# Patient Record
Sex: MALE | Race: BLACK OR AFRICAN AMERICAN | Employment: FULL TIME | URBAN - NONMETROPOLITAN AREA
[De-identification: names, ages, dates, MRNs, and addresses within clinical notes are randomized per-mention and may not be internally consistent; named-entity substitution may affect disease eponyms.]

---

## 2018-05-22 ENCOUNTER — HOSPITAL ENCOUNTER (EMERGENCY)
Age: 43
Discharge: HOME | End: 2018-05-22

## 2018-05-22 VITALS
HEART RATE: 84 BPM | BODY MASS INDEX: 20.38 KG/M2 | OXYGEN SATURATION: 98 % | TEMPERATURE: 99.9 F | RESPIRATION RATE: 16 BRPM | WEIGHT: 158.8 LBS | DIASTOLIC BLOOD PRESSURE: 87 MMHG | HEIGHT: 74 IN | SYSTOLIC BLOOD PRESSURE: 128 MMHG

## 2018-05-22 DIAGNOSIS — R19.7 DIARRHEA, UNSPECIFIED TYPE: Primary | ICD-10-CM

## 2018-05-22 DIAGNOSIS — R11.0 NAUSEA: ICD-10-CM

## 2018-05-22 PROCEDURE — 99214 OFFICE O/P EST MOD 30 MIN: CPT

## 2018-05-22 PROCEDURE — 99202 OFFICE O/P NEW SF 15 MIN: CPT | Performed by: NURSE PRACTITIONER

## 2018-05-22 ASSESSMENT — ENCOUNTER SYMPTOMS
VOMITING: 0
COUGH: 0
WHEEZING: 0
SHORTNESS OF BREATH: 0
ABDOMINAL PAIN: 0
CONSTIPATION: 0
RHINORRHEA: 0
SINUS PRESSURE: 0
SINUS PAIN: 0
CHEST TIGHTNESS: 0
SORE THROAT: 0
NAUSEA: 0

## 2018-05-22 ASSESSMENT — PAIN SCALES - GENERAL: PAINLEVEL_OUTOF10: 3

## 2018-05-22 ASSESSMENT — PAIN DESCRIPTION - DESCRIPTORS: DESCRIPTORS: ACHING;DISCOMFORT

## 2018-05-22 ASSESSMENT — PAIN DESCRIPTION - PAIN TYPE: TYPE: ACUTE PAIN

## 2018-05-22 ASSESSMENT — PAIN DESCRIPTION - LOCATION: LOCATION: ABDOMEN

## 2018-05-24 ASSESSMENT — ENCOUNTER SYMPTOMS: DIARRHEA: 1

## 2020-01-17 ENCOUNTER — HOSPITAL ENCOUNTER (OUTPATIENT)
Age: 45
Setting detail: OBSERVATION
Discharge: HOME | End: 2020-01-18
Attending: INTERNAL MEDICINE | Admitting: INTERNAL MEDICINE
Payer: COMMERCIAL

## 2020-01-17 ENCOUNTER — APPOINTMENT (OUTPATIENT)
Dept: GENERAL RADIOLOGY | Age: 45
End: 2020-01-17
Payer: COMMERCIAL

## 2020-01-17 PROBLEM — R07.9 CHEST PAIN: Status: ACTIVE | Noted: 2020-01-17

## 2020-01-17 LAB
ALBUMIN SERPL-MCNC: 4.4 G/DL (ref 3.5–5.1)
ALP BLD-CCNC: 60 U/L (ref 38–126)
ALT SERPL-CCNC: 24 U/L (ref 11–66)
AMPHETAMINE+METHAMPHETAMINE URINE SCREEN: NEGATIVE
ANION GAP SERPL CALCULATED.3IONS-SCNC: 15 MEQ/L (ref 8–16)
AST SERPL-CCNC: 34 U/L (ref 5–40)
BARBITURATE QUANTITATIVE URINE: NEGATIVE
BASOPHILS # BLD: 0.2 %
BASOPHILS ABSOLUTE: 0 THOU/MM3 (ref 0–0.1)
BENZODIAZEPINE QUANTITATIVE URINE: NEGATIVE
BILIRUB SERPL-MCNC: 0.4 MG/DL (ref 0.3–1.2)
BUN BLDV-MCNC: 12 MG/DL (ref 7–22)
CALCIUM SERPL-MCNC: 9.4 MG/DL (ref 8.5–10.5)
CANNABINOID QUANTITATIVE URINE: POSITIVE
CHLORIDE BLD-SCNC: 104 MEQ/L (ref 98–111)
CO2: 24 MEQ/L (ref 23–33)
COCAINE METABOLITE QUANTITATIVE URINE: NEGATIVE
CREAT SERPL-MCNC: 0.8 MG/DL (ref 0.4–1.2)
EKG ATRIAL RATE: 79 BPM
EKG P AXIS: 64 DEGREES
EKG P-R INTERVAL: 128 MS
EKG Q-T INTERVAL: 380 MS
EKG QRS DURATION: 88 MS
EKG QTC CALCULATION (BAZETT): 435 MS
EKG R AXIS: 63 DEGREES
EKG T AXIS: 65 DEGREES
EKG VENTRICULAR RATE: 79 BPM
EOSINOPHIL # BLD: 0.4 %
EOSINOPHILS ABSOLUTE: 0 THOU/MM3 (ref 0–0.4)
ERYTHROCYTE [DISTWIDTH] IN BLOOD BY AUTOMATED COUNT: 12.8 % (ref 11.5–14.5)
ERYTHROCYTE [DISTWIDTH] IN BLOOD BY AUTOMATED COUNT: 43.4 FL (ref 35–45)
GFR SERPL CREATININE-BSD FRML MDRD: > 90 ML/MIN/1.73M2
GLUCOSE BLD-MCNC: 95 MG/DL (ref 70–108)
HCT VFR BLD CALC: 41.6 % (ref 42–52)
HEMOGLOBIN: 13.4 GM/DL (ref 14–18)
IMMATURE GRANS (ABS): 0.01 THOU/MM3 (ref 0–0.07)
IMMATURE GRANULOCYTES: 0.2 %
LIPASE: 22.2 U/L (ref 5.6–51.3)
LYMPHOCYTES # BLD: 21.2 %
LYMPHOCYTES ABSOLUTE: 1.1 THOU/MM3 (ref 1–4.8)
MCH RBC QN AUTO: 29.9 PG (ref 26–33)
MCHC RBC AUTO-ENTMCNC: 32.2 GM/DL (ref 32.2–35.5)
MCV RBC AUTO: 92.9 FL (ref 80–94)
MONOCYTES # BLD: 10.5 %
MONOCYTES ABSOLUTE: 0.5 THOU/MM3 (ref 0.4–1.3)
NUCLEATED RED BLOOD CELLS: 0 /100 WBC
OPIATES, URINE: NEGATIVE
OSMOLALITY CALCULATION: 284.5 MOSMOL/KG (ref 275–300)
OXYCODONE: NEGATIVE
PHENCYCLIDINE QUANTITATIVE URINE: NEGATIVE
PLATELET # BLD: 251 THOU/MM3 (ref 130–400)
PMV BLD AUTO: 9.7 FL (ref 9.4–12.4)
POTASSIUM REFLEX MAGNESIUM: 4.7 MEQ/L (ref 3.5–5.2)
RBC # BLD: 4.48 MILL/MM3 (ref 4.7–6.1)
SEG NEUTROPHILS: 67.5 %
SEGMENTED NEUTROPHILS ABSOLUTE COUNT: 3.4 THOU/MM3 (ref 1.8–7.7)
SODIUM BLD-SCNC: 143 MEQ/L (ref 135–145)
TOTAL PROTEIN: 7.3 G/DL (ref 6.1–8)
TROPONIN T: < 0.01 NG/ML
WBC # BLD: 5.1 THOU/MM3 (ref 4.8–10.8)

## 2020-01-17 PROCEDURE — 84484 ASSAY OF TROPONIN QUANT: CPT

## 2020-01-17 PROCEDURE — 93005 ELECTROCARDIOGRAM TRACING: CPT | Performed by: EMERGENCY MEDICINE

## 2020-01-17 PROCEDURE — 36415 COLL VENOUS BLD VENIPUNCTURE: CPT

## 2020-01-17 PROCEDURE — 99220 PR INITIAL OBSERVATION CARE/DAY 70 MINUTES: CPT | Performed by: INTERNAL MEDICINE

## 2020-01-17 PROCEDURE — 71046 X-RAY EXAM CHEST 2 VIEWS: CPT

## 2020-01-17 PROCEDURE — 2580000003 HC RX 258: Performed by: INTERNAL MEDICINE

## 2020-01-17 PROCEDURE — G0378 HOSPITAL OBSERVATION PER HR: HCPCS

## 2020-01-17 PROCEDURE — 6370000000 HC RX 637 (ALT 250 FOR IP): Performed by: EMERGENCY MEDICINE

## 2020-01-17 PROCEDURE — 80307 DRUG TEST PRSMV CHEM ANLYZR: CPT

## 2020-01-17 PROCEDURE — 99285 EMERGENCY DEPT VISIT HI MDM: CPT

## 2020-01-17 PROCEDURE — 96372 THER/PROPH/DIAG INJ SC/IM: CPT

## 2020-01-17 PROCEDURE — 6370000000 HC RX 637 (ALT 250 FOR IP): Performed by: INTERNAL MEDICINE

## 2020-01-17 PROCEDURE — 94760 N-INVAS EAR/PLS OXIMETRY 1: CPT

## 2020-01-17 PROCEDURE — 93010 ELECTROCARDIOGRAM REPORT: CPT | Performed by: NUCLEAR MEDICINE

## 2020-01-17 PROCEDURE — 2709999900 HC NON-CHARGEABLE SUPPLY

## 2020-01-17 PROCEDURE — 85025 COMPLETE CBC W/AUTO DIFF WBC: CPT

## 2020-01-17 PROCEDURE — 83690 ASSAY OF LIPASE: CPT

## 2020-01-17 PROCEDURE — 6360000002 HC RX W HCPCS: Performed by: INTERNAL MEDICINE

## 2020-01-17 PROCEDURE — 80053 COMPREHEN METABOLIC PANEL: CPT

## 2020-01-17 RX ORDER — ONDANSETRON 2 MG/ML
4 INJECTION INTRAMUSCULAR; INTRAVENOUS EVERY 6 HOURS PRN
Status: DISCONTINUED | OUTPATIENT
Start: 2020-01-17 | End: 2020-01-18 | Stop reason: HOSPADM

## 2020-01-17 RX ORDER — FAMOTIDINE 20 MG/1
20 TABLET, FILM COATED ORAL 2 TIMES DAILY
Status: DISCONTINUED | OUTPATIENT
Start: 2020-01-17 | End: 2020-01-18 | Stop reason: HOSPADM

## 2020-01-17 RX ORDER — POTASSIUM CHLORIDE 20 MEQ/1
40 TABLET, EXTENDED RELEASE ORAL PRN
Status: DISCONTINUED | OUTPATIENT
Start: 2020-01-17 | End: 2020-01-18 | Stop reason: HOSPADM

## 2020-01-17 RX ORDER — ACETAMINOPHEN 325 MG/1
650 TABLET ORAL EVERY 4 HOURS PRN
Status: DISCONTINUED | OUTPATIENT
Start: 2020-01-17 | End: 2020-01-18 | Stop reason: HOSPADM

## 2020-01-17 RX ORDER — POTASSIUM CHLORIDE 7.45 MG/ML
10 INJECTION INTRAVENOUS PRN
Status: DISCONTINUED | OUTPATIENT
Start: 2020-01-17 | End: 2020-01-18 | Stop reason: HOSPADM

## 2020-01-17 RX ORDER — SODIUM CHLORIDE 0.9 % (FLUSH) 0.9 %
10 SYRINGE (ML) INJECTION PRN
Status: DISCONTINUED | OUTPATIENT
Start: 2020-01-17 | End: 2020-01-18 | Stop reason: HOSPADM

## 2020-01-17 RX ORDER — ASPIRIN 81 MG/1
324 TABLET, CHEWABLE ORAL ONCE
Status: COMPLETED | OUTPATIENT
Start: 2020-01-17 | End: 2020-01-17

## 2020-01-17 RX ORDER — NITROGLYCERIN 0.4 MG/1
0.4 TABLET SUBLINGUAL EVERY 5 MIN PRN
Status: DISCONTINUED | OUTPATIENT
Start: 2020-01-17 | End: 2020-01-18 | Stop reason: HOSPADM

## 2020-01-17 RX ORDER — SODIUM CHLORIDE 0.9 % (FLUSH) 0.9 %
10 SYRINGE (ML) INJECTION EVERY 12 HOURS SCHEDULED
Status: DISCONTINUED | OUTPATIENT
Start: 2020-01-17 | End: 2020-01-18 | Stop reason: HOSPADM

## 2020-01-17 RX ORDER — ASPIRIN 81 MG/1
81 TABLET, CHEWABLE ORAL DAILY
Status: DISCONTINUED | OUTPATIENT
Start: 2020-01-18 | End: 2020-01-18 | Stop reason: HOSPADM

## 2020-01-17 RX ADMIN — ASPIRIN 81 MG 324 MG: 81 TABLET ORAL at 10:34

## 2020-01-17 RX ADMIN — Medication 10 ML: at 21:00

## 2020-01-17 RX ADMIN — NITROGLYCERIN 1 INCH: 20 OINTMENT TOPICAL at 12:05

## 2020-01-17 RX ADMIN — ENOXAPARIN SODIUM 40 MG: 40 INJECTION SUBCUTANEOUS at 21:00

## 2020-01-17 RX ADMIN — FAMOTIDINE 20 MG: 20 TABLET ORAL at 21:00

## 2020-01-17 ASSESSMENT — PAIN DESCRIPTION - PAIN TYPE
TYPE: ACUTE PAIN

## 2020-01-17 ASSESSMENT — PAIN DESCRIPTION - LOCATION
LOCATION: CHEST

## 2020-01-17 ASSESSMENT — PAIN - FUNCTIONAL ASSESSMENT
PAIN_FUNCTIONAL_ASSESSMENT: ACTIVITIES ARE NOT PREVENTED
PAIN_FUNCTIONAL_ASSESSMENT: ACTIVITIES ARE NOT PREVENTED

## 2020-01-17 ASSESSMENT — PAIN SCALES - GENERAL
PAINLEVEL_OUTOF10: 2
PAINLEVEL_OUTOF10: 5

## 2020-01-17 ASSESSMENT — PAIN DESCRIPTION - DESCRIPTORS
DESCRIPTORS: ACHING
DESCRIPTORS: PRESSURE
DESCRIPTORS: SHARP

## 2020-01-17 ASSESSMENT — PAIN DESCRIPTION - PROGRESSION
CLINICAL_PROGRESSION: GRADUALLY IMPROVING
CLINICAL_PROGRESSION: NOT CHANGED

## 2020-01-17 ASSESSMENT — HEART SCORE: ECG: 0

## 2020-01-17 ASSESSMENT — ENCOUNTER SYMPTOMS
ABDOMINAL PAIN: 0
SHORTNESS OF BREATH: 0
COLOR CHANGE: 0
BACK PAIN: 0
CHEST TIGHTNESS: 0
NAUSEA: 0
ABDOMINAL DISTENTION: 0
COUGH: 0

## 2020-01-17 ASSESSMENT — PAIN DESCRIPTION - ORIENTATION
ORIENTATION: LEFT

## 2020-01-17 ASSESSMENT — PAIN DESCRIPTION - DIRECTION: RADIATING_TOWARDS: LEFT ARM

## 2020-01-17 ASSESSMENT — PAIN DESCRIPTION - ONSET
ONSET: ON-GOING
ONSET: ON-GOING

## 2020-01-17 ASSESSMENT — PAIN DESCRIPTION - FREQUENCY
FREQUENCY: INTERMITTENT
FREQUENCY: CONTINUOUS

## 2020-01-17 NOTE — H&P
01/17/20  0936   AST 34   ALT 24   BILITOT 0.4   ALKPHOS 60       No results for input(s): INR in the last 72 hours.  No results for input(s): Anurag Hippo in the last 72 hours. Radiology reports- Xr Chest Standard (2 Vw)    Result Date: 1/17/2020  PROCEDURE: XR CHEST (2 VW) CLINICAL INFORMATION: chest pain COMPARISON: 9/27/2013 TECHNIQUE: PA and lateral views of the chest were obtained. Normal chest. No acute findings. **This report has been created using voice recognition software. It may contain minor errors which are inherent in voice recognition technology. ** Final report electronically signed by Dr. Winifred Donovan on 1/17/2020 9:54 AM      Electronically signed by Susan Butler DO on 1/17/2020 at 12:13 PM

## 2020-01-17 NOTE — LETTER
[address] [facility] [address]  Phone: [phone]             January 18, 2020    Patient: Giovanny Hi   YOB: 1975   Date of Visit: 1/17/2020       To Whom It May Concern:    Wesley Jean-Baptiste was seen and treated in our facility  beginning 1/17/2020 until 1/18/2020 . He may return to work on 01/21/2020.       Sincerely,       Varun Pleitez RN         Signature:__________________________________

## 2020-01-17 NOTE — ED NOTES
ED to inpatient nurses report    Chief Complaint   Patient presents with    Chest Pain      Present to ED from home  LOC: alert and orientated to name, place, date  Vital signs   Vitals:    01/17/20 0923 01/17/20 0928 01/17/20 1036 01/17/20 1206   BP:  120/86  (!) 134/93   Pulse: 87  66 66   Resp: 15   17   SpO2: 100%  100% 98%      Oxygen Baseline on room air     Current needs required: none    LDAs:  LFA 20G  Mobility: Independent  Pending ED orders: none   Present condition: stable     Electronically signed by Lloyd Pierre RN on 1/17/2020 at 12:24 PM     Lloyd Pierre RN  01/17/20 4153

## 2020-01-17 NOTE — ED NOTES
Patient states pain is improved on its own without intervention.  Patient given nitro paste per orders     Shonna Michael, SHANTA  01/17/20 3766

## 2020-01-17 NOTE — ED PROVIDER NOTES
[facility]  eMERGENCY dEPARTMENT eNCOUnter          CHIEF COMPLAINT       Chief Complaint   Patient presents with    Chest Pain       Nurses Notes reviewed and I agree except as noted in the HPI. HISTORY OF PRESENT ILLNESS    Marina Balderas is a 39 y.o. male who presents to the Emergency Department for the evaluation of left-sided chest pain. Patient states the pain began this morning while at work. Approximately 7 AM.  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 Patient thought at first that it hurt worse with movement, but after he relaxed for period time he maintained the left-sided chest pain. Patient is here for evaluation of the left-sided chest pain. Patient denies any known health issues. He is a smoker. Denies hard drug use. Patient states the pain remains in the left upper chest.  Does not radiate to left neck, jaw, upper extremity. Patient rates his pain a 6 to a 7 on a 10 scale at this time. Patient has not taken anything for his pain. He denies fever, cough, body aches or chills. No sick-like symptoms. Patient states he felt fine when he awoke this morning. Patient states he has a family history of cancer but no known heart issues. The HPI was provided by the patient. REVIEW OF SYSTEMS     Review of Systems   Constitutional: Negative for chills, diaphoresis, fatigue and fever. Respiratory: Negative for cough, chest tightness and shortness of breath. Cardiovascular: Positive for chest pain. Negative for palpitations and leg swelling. Gastrointestinal: Negative for abdominal distention, abdominal pain and nausea. Musculoskeletal: Negative for back pain and neck pain. Skin: Negative for color change. Neurological: Negative for dizziness, weakness, light-headedness and numbness. Psychiatric/Behavioral: Negative for behavioral problems.        PAST MEDICAL HISTORY Mental Status: He is alert. Psychiatric:         Mood and Affect: Mood normal.          DIFFERENTIAL DIAGNOSIS:   ACS, unstable angina, pleurisy, pericarditis, endocarditis    DIAGNOSTIC RESULTS     EKG: All EKG's are interpreted by the Emergency Department Physician who either signs or Co-signs this chart in the absence of a cardiologist.    Normal sinus rhythm ventricular rate 79 bpm.  PA interval 128, QRS duration 88, corrected  ms. RADIOLOGY: non-plainfilm images(s) such as CT, Ultrasound and MRI are read by the radiologist.    XR CHEST STANDARD (2 VW)   Final Result   Normal chest. No acute findings. **This report has been created using voice recognition software. It may contain minor errors which are inherent in voice recognition technology. **      Final report electronically signed by Dr. Erin Huston on 1/17/2020 9:54 AM          LABS:     Labs Reviewed   CBC WITH AUTO DIFFERENTIAL - Abnormal; Notable for the following components:       Result Value    RBC 4.48 (*)     Hemoglobin 13.4 (*)     Hematocrit 41.6 (*)     All other components within normal limits   COMPREHENSIVE METABOLIC PANEL W/ REFLEX TO MG FOR LOW K   LIPASE   TROPONIN   ANION GAP   OSMOLALITY   GLOMERULAR FILTRATION RATE, ESTIMATED   URINE DRUG SCREEN   TROPONIN   TROPONIN       EMERGENCY DEPARTMENT COURSE:   Vitals:    Vitals:    01/17/20 1206 01/17/20 1315 01/17/20 1637 01/17/20 1713   BP: (!) 134/93 135/83 119/82    Pulse: 66 68 63    Resp: 17 16 15 16   Temp:  98 °F (36.7 °C) 98 °F (36.7 °C)    TempSrc:  Oral Oral    SpO2: 98% 100% 100% 99%   Weight:  149 lb 4.8 oz (67.7 kg)     Height:  6' 2\" (1.88 m)         11:28 AM: The patient was seen and evaluated. The patient currently has chest pain upon initial assessment. Appropriate labs and imaging are ordered. Patient is given 324 mg aspirin. Will order trial of nitroglycerin sublingual as patient continues to have active chest pain.     Patient EKG has no acute ST elevation or depression concerning for ischemia or infarction. The patient, however, had relief with nitroglycerin. He states his pain went from a 6 to a 7 down to completely gone. Heart Score for chest pain patients  History: Moderately Suspicious  ECG: Normal  Patient Age: < 45 years  *Risk factors for Atherosclerotic disease: Cigarette smoking  Risk Factors: 1 or 2 risk factors  Troponin: < 1X normal limit  Heart Score Total: 2      MDM:  Patient had complete relief of his chest pain with nitroglycerin. His heart score is 2, troponin is negative, EKG has no changes. However, due to the relief of pain with his nitro, I contacted Dr. Mendoza Gaona of the hospitalist service who graciously agreed to admit the patient for observation admission. CRITICAL CARE:   None    CONSULTS:  None    PROCEDURES:  None    FINAL IMPRESSION    Chest Pain    DISPOSITION/PLAN   Admit    PATIENT REFERRED TO:  NORMA Gustafson CNP  [address]             DISCHARGE MEDICATIONS:  Current Discharge Medication List          (Please note that portions of this note were completed with a voice recognition program.  Efforts were made to edit the dictations but occasionally words are mis-transcribed.)    The patient was given an opportunity to see the Emergency Attending. The patient voiced understanding that I was a Mid-LevelProvider and was in agreement with being seen independently by myself.           NORMA Pyle CNP  01/17/20 2891

## 2020-01-18 VITALS
SYSTOLIC BLOOD PRESSURE: 125 MMHG | BODY MASS INDEX: 19.16 KG/M2 | OXYGEN SATURATION: 97 % | RESPIRATION RATE: 16 BRPM | HEIGHT: 74 IN | DIASTOLIC BLOOD PRESSURE: 74 MMHG | WEIGHT: 149.3 LBS | HEART RATE: 68 BPM | TEMPERATURE: 97.6 F

## 2020-01-18 LAB
ANION GAP SERPL CALCULATED.3IONS-SCNC: 10 MEQ/L (ref 8–16)
AVERAGE GLUCOSE: 99 MG/DL (ref 70–126)
BUN BLDV-MCNC: 11 MG/DL (ref 7–22)
CALCIUM SERPL-MCNC: 8.8 MG/DL (ref 8.5–10.5)
CHLORIDE BLD-SCNC: 102 MEQ/L (ref 98–111)
CHOLESTEROL, TOTAL: 172 MG/DL (ref 100–199)
CO2: 25 MEQ/L (ref 23–33)
CREAT SERPL-MCNC: 0.9 MG/DL (ref 0.4–1.2)
EKG ATRIAL RATE: 67 BPM
EKG P AXIS: 62 DEGREES
EKG P-R INTERVAL: 144 MS
EKG Q-T INTERVAL: 398 MS
EKG QRS DURATION: 90 MS
EKG QTC CALCULATION (BAZETT): 420 MS
EKG R AXIS: 56 DEGREES
EKG T AXIS: 64 DEGREES
EKG VENTRICULAR RATE: 67 BPM
ERYTHROCYTE [DISTWIDTH] IN BLOOD BY AUTOMATED COUNT: 12.7 % (ref 11.5–14.5)
ERYTHROCYTE [DISTWIDTH] IN BLOOD BY AUTOMATED COUNT: 43.3 FL (ref 35–45)
GFR SERPL CREATININE-BSD FRML MDRD: > 90 ML/MIN/1.73M2
GLUCOSE BLD-MCNC: 88 MG/DL (ref 70–108)
HBA1C MFR BLD: 5.3 % (ref 4.4–6.4)
HCT VFR BLD CALC: 41.9 % (ref 42–52)
HDLC SERPL-MCNC: 84 MG/DL
HEMOGLOBIN: 13.3 GM/DL (ref 14–18)
LDL CHOLESTEROL CALCULATED: 65 MG/DL
MAGNESIUM: 1.8 MG/DL (ref 1.6–2.4)
MCH RBC QN AUTO: 29.4 PG (ref 26–33)
MCHC RBC AUTO-ENTMCNC: 31.7 GM/DL (ref 32.2–35.5)
MCV RBC AUTO: 92.5 FL (ref 80–94)
PLATELET # BLD: 232 THOU/MM3 (ref 130–400)
PMV BLD AUTO: 9.8 FL (ref 9.4–12.4)
POTASSIUM REFLEX MAGNESIUM: 4.3 MEQ/L (ref 3.5–5.2)
RBC # BLD: 4.53 MILL/MM3 (ref 4.7–6.1)
SODIUM BLD-SCNC: 137 MEQ/L (ref 135–145)
TRIGL SERPL-MCNC: 117 MG/DL (ref 0–199)
WBC # BLD: 4.5 THOU/MM3 (ref 4.8–10.8)

## 2020-01-18 PROCEDURE — 3430000000 HC RX DIAGNOSTIC RADIOPHARMACEUTICAL: Performed by: INTERNAL MEDICINE

## 2020-01-18 PROCEDURE — 93005 ELECTROCARDIOGRAM TRACING: CPT | Performed by: INTERNAL MEDICINE

## 2020-01-18 PROCEDURE — 99244 OFF/OP CNSLTJ NEW/EST MOD 40: CPT | Performed by: INTERNAL MEDICINE

## 2020-01-18 PROCEDURE — G0378 HOSPITAL OBSERVATION PER HR: HCPCS

## 2020-01-18 PROCEDURE — 94760 N-INVAS EAR/PLS OXIMETRY 1: CPT

## 2020-01-18 PROCEDURE — 83735 ASSAY OF MAGNESIUM: CPT

## 2020-01-18 PROCEDURE — 6370000000 HC RX 637 (ALT 250 FOR IP): Performed by: INTERNAL MEDICINE

## 2020-01-18 PROCEDURE — 80061 LIPID PANEL: CPT

## 2020-01-18 PROCEDURE — 85027 COMPLETE CBC AUTOMATED: CPT

## 2020-01-18 PROCEDURE — 83036 HEMOGLOBIN GLYCOSYLATED A1C: CPT

## 2020-01-18 PROCEDURE — 6360000002 HC RX W HCPCS: Performed by: INTERNAL MEDICINE

## 2020-01-18 PROCEDURE — 93017 CV STRESS TEST TRACING ONLY: CPT

## 2020-01-18 PROCEDURE — 78452 HT MUSCLE IMAGE SPECT MULT: CPT

## 2020-01-18 PROCEDURE — 80048 BASIC METABOLIC PNL TOTAL CA: CPT

## 2020-01-18 PROCEDURE — A9500 TC99M SESTAMIBI: HCPCS | Performed by: INTERNAL MEDICINE

## 2020-01-18 PROCEDURE — 2709999900 HC NON-CHARGEABLE SUPPLY

## 2020-01-18 PROCEDURE — 2580000003 HC RX 258: Performed by: INTERNAL MEDICINE

## 2020-01-18 PROCEDURE — 93306 TTE W/DOPPLER COMPLETE: CPT

## 2020-01-18 PROCEDURE — 93010 ELECTROCARDIOGRAM REPORT: CPT | Performed by: NUCLEAR MEDICINE

## 2020-01-18 PROCEDURE — 99217 PR OBSERVATION CARE DISCHARGE MANAGEMENT: CPT | Performed by: INTERNAL MEDICINE

## 2020-01-18 PROCEDURE — 96374 THER/PROPH/DIAG INJ IV PUSH: CPT

## 2020-01-18 PROCEDURE — 36415 COLL VENOUS BLD VENIPUNCTURE: CPT

## 2020-01-18 RX ORDER — KETOROLAC TROMETHAMINE 30 MG/ML
30 INJECTION, SOLUTION INTRAMUSCULAR; INTRAVENOUS ONCE
Status: COMPLETED | OUTPATIENT
Start: 2020-01-18 | End: 2020-01-18

## 2020-01-18 RX ORDER — METOPROLOL TARTRATE 5 MG/5ML
5 INJECTION INTRAVENOUS EVERY 5 MIN PRN
Status: DISCONTINUED | OUTPATIENT
Start: 2020-01-18 | End: 2020-01-18 | Stop reason: HOSPADM

## 2020-01-18 RX ORDER — METOPROLOL TARTRATE 5 MG/5ML
5 INJECTION INTRAVENOUS EVERY 5 MIN PRN
Status: DISCONTINUED | OUTPATIENT
Start: 2020-01-18 | End: 2020-01-18 | Stop reason: SDUPTHER

## 2020-01-18 RX ORDER — SODIUM CHLORIDE 9 MG/ML
500 INJECTION, SOLUTION INTRAVENOUS CONTINUOUS PRN
Status: DISCONTINUED | OUTPATIENT
Start: 2020-01-18 | End: 2020-01-18 | Stop reason: HOSPADM

## 2020-01-18 RX ORDER — SODIUM CHLORIDE 0.9 % (FLUSH) 0.9 %
10 SYRINGE (ML) INJECTION PRN
Status: DISCONTINUED | OUTPATIENT
Start: 2020-01-18 | End: 2020-01-18 | Stop reason: SDUPTHER

## 2020-01-18 RX ORDER — ALBUTEROL SULFATE 90 UG/1
2 AEROSOL, METERED RESPIRATORY (INHALATION) PRN
Status: DISCONTINUED | OUTPATIENT
Start: 2020-01-18 | End: 2020-01-18 | Stop reason: HOSPADM

## 2020-01-18 RX ORDER — SODIUM CHLORIDE 9 MG/ML
500 INJECTION, SOLUTION INTRAVENOUS CONTINUOUS PRN
Status: DISCONTINUED | OUTPATIENT
Start: 2020-01-18 | End: 2020-01-18 | Stop reason: SDUPTHER

## 2020-01-18 RX ORDER — ATROPINE SULFATE 0.1 MG/ML
0.5 INJECTION INTRAVENOUS EVERY 5 MIN PRN
Status: DISCONTINUED | OUTPATIENT
Start: 2020-01-18 | End: 2020-01-18 | Stop reason: HOSPADM

## 2020-01-18 RX ORDER — NITROGLYCERIN 0.4 MG/1
0.4 TABLET SUBLINGUAL EVERY 5 MIN PRN
Status: DISCONTINUED | OUTPATIENT
Start: 2020-01-18 | End: 2020-01-18 | Stop reason: SDUPTHER

## 2020-01-18 RX ORDER — ATROPINE SULFATE 0.1 MG/ML
0.5 INJECTION INTRAVENOUS EVERY 5 MIN PRN
Status: DISCONTINUED | OUTPATIENT
Start: 2020-01-18 | End: 2020-01-18 | Stop reason: SDUPTHER

## 2020-01-18 RX ORDER — ALBUTEROL SULFATE 90 UG/1
2 AEROSOL, METERED RESPIRATORY (INHALATION) PRN
Status: DISCONTINUED | OUTPATIENT
Start: 2020-01-18 | End: 2020-01-18 | Stop reason: SDUPTHER

## 2020-01-18 RX ORDER — SODIUM CHLORIDE 0.9 % (FLUSH) 0.9 %
10 SYRINGE (ML) INJECTION PRN
Status: DISCONTINUED | OUTPATIENT
Start: 2020-01-18 | End: 2020-01-18 | Stop reason: HOSPADM

## 2020-01-18 RX ADMIN — FAMOTIDINE 20 MG: 20 TABLET ORAL at 08:49

## 2020-01-18 RX ADMIN — Medication 35 MILLICURIE: at 11:45

## 2020-01-18 RX ADMIN — Medication 10 ML: at 10:15

## 2020-01-18 RX ADMIN — Medication 8.3 MILLICURIE: at 10:36

## 2020-01-18 RX ADMIN — ASPIRIN 81 MG 81 MG: 81 TABLET ORAL at 08:49

## 2020-01-18 RX ADMIN — KETOROLAC TROMETHAMINE 30 MG: 30 INJECTION, SOLUTION INTRAMUSCULAR at 10:15

## 2020-01-18 ASSESSMENT — PAIN SCALES - GENERAL
PAINLEVEL_OUTOF10: 0
PAINLEVEL_OUTOF10: 1

## 2020-01-18 NOTE — FLOWSHEET NOTE
01/17/20 1845   Encounter Summary   Services provided to: Patient   Referral/Consult From: Nurse   Support System Family members   Continue Visiting Yes  (1/17)   Complexity of Encounter Moderate   Length of Encounter 45 minutes   Advance Care Planning Yes   Advance Directives (For Healthcare)   Healthcare Directive No, patient does not have an advance directive for healthcare treatment   Information on Healthcare Directives Requested Yes   Patient Requests Assistance Other (Comment)   Advance Directive Consult: Advance Directive education provided. Patient had no prior knowledge. Wants to talk to the person he is choosing to be sure it is OK with urmila.  Will contact when ready to complete.

## 2020-01-18 NOTE — PLAN OF CARE
Problem: Pain:  Goal: Pain level will decrease  Description  Pain level will decrease  Outcome: Ongoing  Note:   Patient voiced pain this shift at 2/10. Patient's pain goal is 0/10. RN continues to deliver PRN medication and attempts noninvasive methods such as repositioning, ice packs, massage. Pain is re-assessed frequently. Bed alarm set after pain meds given. Problem: Pain:  Goal: Control of acute pain  Description  Control of acute pain  Outcome: Ongoing  Note:   Patient voiced pain this shift at 2/10. Patient's pain goal is 0/10. RN continues to deliver PRN medication and attempts noninvasive methods such as repositioning, ice packs, massage. Pain is re-assessed frequently. Bed alarm set after pain meds given. Problem: Pain:  Goal: Control of chronic pain  Description  Control of chronic pain  Outcome: Ongoing  Note:   Patient voiced pain this shift at 2/10. Patient's pain goal is 0/10. RN continues to deliver PRN medication and attempts noninvasive methods such as repositioning, ice packs, massage. Pain is re-assessed frequently. Bed alarm set after pain meds given. Problem: Tissue Perfusion - Cardiopulmonary, Altered:  Goal: Absence of angina  Description  Absence of angina  Outcome: Ongoing  Note:   Troponin levels and EKG monitors being monitored. Pulse oximetry being monitored every four hours. Problem: Discharge Planning:  Goal: Discharged to appropriate level of care  Description  Discharged to appropriate level of care  Outcome: Ongoing  Note:   Patient plans to return home at discharge. Patient will not need assistance at home. Problem: Cardiac Output - Decreased:  Goal: Hemodynamic stability will improve  Description  Hemodynamic stability will improve  Outcome: Ongoing  Note:   Blood pressure WNL. Vitals monitored and recorded. Patient hemodynamically stable. Cardiac monitor in place with strips being read every four hours. Care plan reviewed with patient. Patient verbalizes understanding of the plan of care and contribute to goal setting.

## 2020-01-18 NOTE — DISCHARGE SUMMARY
muscles intact. Conjunctivae/corneas clear. Neck: Supple, with full range of motion. No jugular venous distention. Trachea midline. Respiratory:  Normal respiratory effort. Clear to auscultation, bilaterally without Rales/Wheezes/Rhonchi. Cardiovascular:  Regular rate and rhythm with normal S1/S2 without murmurs, rubs or gallops. Abdomen: Soft, non-tender, non-distended with normal bowel sounds. Musculoskeletal:  No clubbing, cyanosis or edema bilaterally. Full range of motion without deformity. Skin: Skin color, texture, turgor normal.  No rashes or lesions. Neurologic:  Neurovascularly intact without any focal sensory/motor deficits. Cranial nerves: II-XII intact, grossly non-focal.  Psychiatric:  Alert and oriented, thought content appropriate, normal insight  Capillary Refill: Brisk,< 3 seconds   Peripheral Pulses: +2 palpable, equal bilaterally       Labs: For convenience and continuity at follow-up the following most recent labs are provided:      CBC:    Lab Results   Component Value Date    WBC 4.5 01/18/2020    HGB 13.3 01/18/2020    HCT 41.9 01/18/2020     01/18/2020       Renal:    Lab Results   Component Value Date     01/18/2020    K 4.3 01/18/2020     01/18/2020    CO2 25 01/18/2020    BUN 11 01/18/2020    CREATININE 0.9 01/18/2020    CALCIUM 8.8 01/18/2020         Significant Diagnostic Studies    Radiology:   NM Cardiac Stress Test Nuclear Imaging- Treadmill   Final Result      XR CHEST STANDARD (2 VW)   Final Result   Normal chest. No acute findings. **This report has been created using voice recognition software. It may contain minor errors which are inherent in voice recognition technology. **      Final report electronically signed by Dr. Claude Montejo on 1/17/2020 9:54 AM      NM Cardiac Stress Test Nuclear Imaging- Treadmill    (Results Pending)          Consults:     IP CONSULT TO CARDIOLOGY  IP CONSULT TO SPIRITUAL SERVICES    Disposition:    [x] Home

## 2020-01-18 NOTE — PLAN OF CARE
Problem: Pain:  Goal: Pain level will decrease  Description  Pain level will decrease  1/18/2020 1153 by Varun Pleitez RN  Outcome: Ongoing  Note:   Patient complains of pain in the chest a 1/10 with a pain goal of 0/10. Patient repositions self frequently and is resting in bed. One time dose of toradol given as ordered for pain. Will conitnue tp assess patients pain with hourly rounding. Problem: Pain:  Goal: Control of acute pain  Description  Control of acute pain  1/18/2020 1153 by Varun Pleitez RN  Outcome: Ongoing  Note:   Patient states that pain interventions help to ease the pain. Problem: Pain:  Goal: Control of chronic pain  Description  Control of chronic pain  1/18/2020 1153 by Varun Pleitez RN  Outcome: Ongoing  Note:   No complaints of chronic pain at this time. Problem: Tissue Perfusion - Cardiopulmonary, Altered:  Goal: Absence of angina  Description  Absence of angina  1/18/2020 1153 by Varun Pleitez RN  Outcome: Ongoing  Note:   Patient complains of chest pain a 1/10. One time dose of toradol given as ordered. Patient to have a stress test.      Problem: Discharge Planning:  Goal: Discharged to appropriate level of care  Description  Discharged to appropriate level of care  1/18/2020 1153 by Varun Pleitez RN  Outcome: Ongoing  Note:   Patient to discharge home. Problem: Cardiac Output - Decreased:  Goal: Hemodynamic stability will improve  Description  Hemodynamic stability will improve  1/18/2020 1153 by Varun Pleitez RN  Outcome: Ongoing  Note:   Patients vitals stable this shift. No signs or symptoms of distress noted at this time. Care plan reviewed with patient. Patient verbalizes understanding of the plan of care and contributes to goal setting.

## 2020-01-18 NOTE — CONSULTS
Not on file   Tobacco Use    Smoking status: Current Every Day Smoker     Packs/day: 0.50     Types: Cigarettes    Smokeless tobacco: Never Used   Substance and Sexual Activity    Alcohol use: Yes     Comment: 3/4 240z per day    Drug use: Yes     Types: Marijuana    Sexual activity: Not on file   Lifestyle    Physical activity:     Days per week: Not on file     Minutes per session: Not on file    Stress: Not on file   Relationships    Social connections:     Talks on phone: Not on file     Gets together: Not on file     Attends Religion service: Not on file     Active member of club or organization: Not on file     Attends meetings of clubs or organizations: Not on file     Relationship status: Not on file    Intimate partner violence:     Fear of current or ex partner: Not on file     Emotionally abused: Not on file     Physically abused: Not on file     Forced sexual activity: Not on file   Other Topics Concern    Not on file   Social History Narrative    Not on file       Current Facility-Administered Medications   Medication Dose Route Frequency Provider Last Rate Last Dose    ketorolac (TORADOL) injection 30 mg  30 mg Intravenous Once Aldair Hairston MD        nitroGLYCERIN (NITROSTAT) SL tablet 0.4 mg  0.4 mg Sublingual Q5 Min PRN Nicholas Ríos APRN - CNP        sodium chloride flush 0.9 % injection 10 mL  10 mL Intravenous 2 times per day [facility], DO   10 mL at 01/17/20 2100    sodium chloride flush 0.9 % injection 10 mL  10 mL Intravenous PRN [facility], DO        magnesium hydroxide (MILK OF MAGNESIA) 400 MG/5ML suspension 30 mL  30 mL Oral Daily PRN [facility], DO        ondansetron (ZOFRAN) injection 4 mg  4 mg Intravenous Q6H PRN [facility], DO        aspirin chewable tablet 81 mg  81 mg Oral Daily Mumtaz Arias DO   81 mg at 01/18/20 0849    magnesium sulfate 1 g in dextrose 5 % 100 mL IVPB  1 g Intravenous PRN [facility], DO        potassium chloride Yany Bel M) extended release tablet 40 mEq  40 mEq Oral PRN Sherren Matt, DO        Or    potassium bicarb-citric acid (EFFER-K) effervescent tablet 40 mEq  40 mEq Oral PRN Sherren Nice, DO        Or    potassium chloride 10 mEq/100 mL IVPB (Peripheral Line)  10 mEq Intravenous PRN Sherren Nice, DO        famotidine (PEPCID) tablet 20 mg  20 mg Oral BID Sherren Matt, DO   20 mg at 01/18/20 0849    acetaminophen (TYLENOL) tablet 650 mg  650 mg Oral Q4H PRN Antonyren Nice, DO        enoxaparin (LOVENOX) injection 40 mg  40 mg Subcutaneous Daily Sherren Nice, DO   40 mg at 01/17/20 2100    influenza quadrivalent split vaccine (FLUZONE;FLUARIX;FLULAVAL;AFLURIA) injection 0.5 mL  0.5 mL Intramuscular Once Sherren Matt, DO           Review of Systems -     General ROS: negative  Psychological ROS: negative  Hematological and Lymphatic ROS: No history of blood clots or bleeding disorder. Respiratory ROS: no cough,  or wheezing, the rest see HPI  Cardiovascular ROS: See HPI  Gastrointestinal ROS: negative  Genito-Urinary ROS: no dysuria, trouble voiding, or hematuria  Musculoskeletal ROS: negative  Neurological ROS: no TIA or stroke symptoms  Dermatological ROS: negative      Blood pressure 125/74, pulse 68, temperature 97.6 °F (36.4 °C), temperature source Oral, resp. rate 16, height 6' 2\" (1.88 m), weight 149 lb 4.8 oz (67.7 kg), SpO2 99 %.         Physical Examination:    General appearance - alert, well appearing, and in no distress  HEENT- Pink conjunctiva  , Non-icteri sclera,PERRLA  Mental status - alert, oriented to person, place, and time  Neck - supple, no significant adenopathy, no JVD, or carotid bruits  Chest - clear to auscultation, no wheezes, rales or rhonchi, symmetric air entry  Heart - normal rate, regular rhythm, normal S1, S2, no murmurs, rubs, clicks or gallops  Abdomen - soft, nontender, nondistended, no masses or organomegaly  CONCHITA- no CVA or flank tenderness, no suprapubic tenderness  Neurological - alert, oriented, normal speech, no focal findings or movement disorder noted  Musculoskeletal/limbs - no joint tenderness, deformity or swelling   - peripheral pulses normal, no pedal edema, no clubbing or cyanosis  Skin - normal coloration and turgor, no rashes, no suspicious skin lesions noted  Psych- appropriate mood and affect    Lab  No results for input(s): CKTOTAL, CKMB, CKMBINDEX, TROPONINI in the last 72 hours.   CBC:   Lab Results   Component Value Date    WBC 4.5 01/18/2020    RBC 4.53 01/18/2020    HGB 13.3 01/18/2020    HCT 41.9 01/18/2020    MCV 92.5 01/18/2020    MCH 29.4 01/18/2020    MCHC 31.7 01/18/2020    RDW 13.6 09/27/2013     01/18/2020    MPV 9.8 01/18/2020     BMP:    Lab Results   Component Value Date     01/18/2020    K 4.3 01/18/2020     01/18/2020    CO2 25 01/18/2020    BUN 11 01/18/2020    LABALBU 4.4 01/17/2020    CREATININE 0.9 01/18/2020    CALCIUM 8.8 01/18/2020    LABGLOM >90 01/18/2020    GLUCOSE 88 01/18/2020     Hepatic Function Panel:    Lab Results   Component Value Date    ALKPHOS 60 01/17/2020    ALT 24 01/17/2020    AST 34 01/17/2020    PROT 7.3 01/17/2020    BILITOT 0.4 01/17/2020    BILIDIR 0.2 09/27/2013    LABALBU 4.4 01/17/2020     Magnesium:    Lab Results   Component Value Date    MG 1.8 01/18/2020     Warfarin PT/INR:  No components found for: PTPATWAR, PTINRWAR  HgBA1c:    Lab Results   Component Value Date    LABA1C 5.3 01/18/2020     FLP:    Lab Results   Component Value Date    TRIG 117 01/18/2020    HDL 84 01/18/2020    LDLCALC 65 01/18/2020     TSH:  No results found for: TSH    EKG NSR, NO acute abn  Troponin negative    Assessment     Chest Pain atypical  Tobacco abuse      Plan     toradol 30 mg IV x1    Stop NTG paste    Functional study    D/w the pat the plan of care    Based on the course and the result of the above test we will gauge further care    Thank you for allowing me to participate in the care of your patient.  Please don't hesitate to contact me regarding any further issues related to the patient care      Peg Merlin, MD

## 2020-01-22 ENCOUNTER — HOSPITAL ENCOUNTER (EMERGENCY)
Age: 45
Discharge: HOME | End: 2020-01-22
Payer: COMMERCIAL

## 2020-01-22 ENCOUNTER — APPOINTMENT (OUTPATIENT)
Dept: GENERAL RADIOLOGY | Age: 45
End: 2020-01-22
Payer: COMMERCIAL

## 2020-01-22 VITALS
BODY MASS INDEX: 19.12 KG/M2 | DIASTOLIC BLOOD PRESSURE: 87 MMHG | SYSTOLIC BLOOD PRESSURE: 119 MMHG | RESPIRATION RATE: 16 BRPM | TEMPERATURE: 98.3 F | OXYGEN SATURATION: 99 % | WEIGHT: 149 LBS | HEIGHT: 74 IN | HEART RATE: 83 BPM

## 2020-01-22 LAB
ALBUMIN SERPL-MCNC: 4.5 G/DL (ref 3.5–5.1)
ALP BLD-CCNC: 61 U/L (ref 38–126)
ALT SERPL-CCNC: 32 U/L (ref 11–66)
ANION GAP SERPL CALCULATED.3IONS-SCNC: 12 MEQ/L (ref 8–16)
AST SERPL-CCNC: 40 U/L (ref 5–40)
BASOPHILS # BLD: 0.3 %
BASOPHILS ABSOLUTE: 0 THOU/MM3 (ref 0–0.1)
BILIRUB SERPL-MCNC: < 0.2 MG/DL (ref 0.3–1.2)
BUN BLDV-MCNC: 15 MG/DL (ref 7–22)
CALCIUM SERPL-MCNC: 9.1 MG/DL (ref 8.5–10.5)
CHLORIDE BLD-SCNC: 103 MEQ/L (ref 98–111)
CO2: 25 MEQ/L (ref 23–33)
CREAT SERPL-MCNC: 0.9 MG/DL (ref 0.4–1.2)
EKG ATRIAL RATE: 91 BPM
EKG P AXIS: 66 DEGREES
EKG P-R INTERVAL: 128 MS
EKG Q-T INTERVAL: 364 MS
EKG QRS DURATION: 88 MS
EKG QTC CALCULATION (BAZETT): 447 MS
EKG R AXIS: 54 DEGREES
EKG T AXIS: 63 DEGREES
EKG VENTRICULAR RATE: 91 BPM
EOSINOPHIL # BLD: 1.7 %
EOSINOPHILS ABSOLUTE: 0.1 THOU/MM3 (ref 0–0.4)
ERYTHROCYTE [DISTWIDTH] IN BLOOD BY AUTOMATED COUNT: 12.8 % (ref 11.5–14.5)
ERYTHROCYTE [DISTWIDTH] IN BLOOD BY AUTOMATED COUNT: 44.4 FL (ref 35–45)
GFR SERPL CREATININE-BSD FRML MDRD: > 90 ML/MIN/1.73M2
GLUCOSE BLD-MCNC: 103 MG/DL (ref 70–108)
HCT VFR BLD CALC: 43.9 % (ref 42–52)
HEMOGLOBIN: 14 GM/DL (ref 14–18)
IMMATURE GRANS (ABS): 0.02 THOU/MM3 (ref 0–0.07)
IMMATURE GRANULOCYTES: 0.3 %
LYMPHOCYTES # BLD: 34.4 %
LYMPHOCYTES ABSOLUTE: 2 THOU/MM3 (ref 1–4.8)
MCH RBC QN AUTO: 30.3 PG (ref 26–33)
MCHC RBC AUTO-ENTMCNC: 31.9 GM/DL (ref 32.2–35.5)
MCV RBC AUTO: 95 FL (ref 80–94)
MONOCYTES # BLD: 9.6 %
MONOCYTES ABSOLUTE: 0.6 THOU/MM3 (ref 0.4–1.3)
NUCLEATED RED BLOOD CELLS: 0 /100 WBC
OSMOLALITY CALCULATION: 280.5 MOSMOL/KG (ref 275–300)
PLATELET # BLD: 226 THOU/MM3 (ref 130–400)
PMV BLD AUTO: 9.7 FL (ref 9.4–12.4)
POTASSIUM REFLEX MAGNESIUM: 4.6 MEQ/L (ref 3.5–5.2)
RBC # BLD: 4.62 MILL/MM3 (ref 4.7–6.1)
SEG NEUTROPHILS: 53.7 %
SEGMENTED NEUTROPHILS ABSOLUTE COUNT: 3.1 THOU/MM3 (ref 1.8–7.7)
SODIUM BLD-SCNC: 140 MEQ/L (ref 135–145)
TOTAL PROTEIN: 7.2 G/DL (ref 6.1–8)
TROPONIN T: < 0.01 NG/ML
TROPONIN T: < 0.01 NG/ML
WBC # BLD: 5.8 THOU/MM3 (ref 4.8–10.8)

## 2020-01-22 PROCEDURE — 99285 EMERGENCY DEPT VISIT HI MDM: CPT

## 2020-01-22 PROCEDURE — 80053 COMPREHEN METABOLIC PANEL: CPT

## 2020-01-22 PROCEDURE — 6360000002 HC RX W HCPCS: Performed by: NURSE PRACTITIONER

## 2020-01-22 PROCEDURE — 96374 THER/PROPH/DIAG INJ IV PUSH: CPT

## 2020-01-22 PROCEDURE — 93005 ELECTROCARDIOGRAM TRACING: CPT | Performed by: EMERGENCY MEDICINE

## 2020-01-22 PROCEDURE — 71046 X-RAY EXAM CHEST 2 VIEWS: CPT

## 2020-01-22 PROCEDURE — 84484 ASSAY OF TROPONIN QUANT: CPT

## 2020-01-22 PROCEDURE — 85025 COMPLETE CBC W/AUTO DIFF WBC: CPT

## 2020-01-22 PROCEDURE — 36415 COLL VENOUS BLD VENIPUNCTURE: CPT

## 2020-01-22 RX ORDER — KETOROLAC TROMETHAMINE 30 MG/ML
30 INJECTION, SOLUTION INTRAMUSCULAR; INTRAVENOUS ONCE
Status: COMPLETED | OUTPATIENT
Start: 2020-01-22 | End: 2020-01-22

## 2020-01-22 RX ADMIN — KETOROLAC TROMETHAMINE 30 MG: 30 INJECTION, SOLUTION INTRAMUSCULAR at 06:32

## 2020-01-22 ASSESSMENT — ENCOUNTER SYMPTOMS
WHEEZING: 0
SHORTNESS OF BREATH: 0
ABDOMINAL PAIN: 0
BACK PAIN: 0
COLOR CHANGE: 0
NAUSEA: 1
RHINORRHEA: 0
COUGH: 0
DIARRHEA: 0
SORE THROAT: 0
CONSTIPATION: 0
VOMITING: 0

## 2020-01-22 ASSESSMENT — PAIN DESCRIPTION - LOCATION
LOCATION: CHEST
LOCATION: CHEST

## 2020-01-22 ASSESSMENT — PAIN DESCRIPTION - FREQUENCY: FREQUENCY: CONTINUOUS

## 2020-01-22 ASSESSMENT — PAIN SCALES - GENERAL
PAINLEVEL_OUTOF10: 4
PAINLEVEL_OUTOF10: 1
PAINLEVEL_OUTOF10: 4

## 2020-01-22 ASSESSMENT — PAIN DESCRIPTION - DESCRIPTORS
DESCRIPTORS: PRESSURE
DESCRIPTORS: PRESSURE

## 2020-01-22 ASSESSMENT — PAIN DESCRIPTION - PAIN TYPE
TYPE: ACUTE PAIN
TYPE: ACUTE PAIN

## 2020-01-22 ASSESSMENT — PAIN DESCRIPTION - ORIENTATION
ORIENTATION: LEFT
ORIENTATION: LEFT

## 2020-01-22 ASSESSMENT — HEART SCORE: ECG: 0

## 2020-01-22 NOTE — ED NOTES
Patient resting quietly in cot showing no signs of distress at this time. Rates pain 1/10. Updated on POC. Will continue to monitor.       Robert Tellez RN  01/22/20 9014

## 2020-01-22 NOTE — ED NOTES
ED nurse-to-nurse bedside report    Chief Complaint   Patient presents with    Chest Pain      LOC: alert and orientated to name, place, date  Vital signs   Vitals:    01/22/20 0611 01/22/20 0612   BP:  (!) 128/93   Pulse: 100    Resp: 16    Temp:  98.3 °F (36.8 °C)   TempSrc:  Oral   SpO2:  99%   Weight: 149 lb (67.6 kg)    Height: 6' 2\" (1.88 m)       Pain:    Pain Interventions: toradol  Pain Goal: 2-3  Oxygen: No    Current needs required none   Telemetry: Yes  LDAs:   Peripheral IV 01/22/20 Right Antecubital (Active)   Site Assessment Clean; Intact;Dry 1/22/2020  6:16 AM   Line Status Normal saline locked;Specimen collected 1/22/2020  6:16 AM   Dressing Status Clean;Dry; Intact 1/22/2020  6:16 AM   Dressing Intervention New 1/22/2020  6:16 AM     Continuous Infusions:   Mobility: Independent  Lima Fall Risk Score: No flowsheet data found. Fall Interventions: side rails  Report given to: Jonathan Khanna RN.         Mae Busby RN  01/22/20 0999

## 2020-01-22 NOTE — ED TRIAGE NOTES
Pt to ED with complaints of chest pain that just occurred around 20 minutes ago. Pt describes pain as pressure. Pt rates pressure 4/10 at this time. Pt states he was getting ready for work when pain began. Pt states pain does not radiate anywhere. Pt vital signs stable and respirations unlabored.

## 2020-01-22 NOTE — ED NOTES
Patient resting quietly in cot showing no signs of distress at this time. Denies any pain. Updated on POC. Will continue to monitor.       Matti Haq RN  01/22/20 7952

## 2020-01-22 NOTE — ED PROVIDER NOTES
Brandon [facility] 13 COMPLAINT       Chief Complaint   Patient presents with    Chest Pain       Nurses Notes reviewed and I agree except as noted in the HPI. HISTORY OF PRESENT ILLNESS    Daryl Jensen is a 39 y.o. male who presents to the Emergency Department for the evaluation of left sided chest pain/pressure with onset of approximately 30 minutes PTA here. Patient was getting ready for work when this pain/pressure came on. The patient had a recent admission to [facility] on 01/17 for chest pain. Stress test was negative for ischemia and chest pain improved with NSAIDs. Dr. Alva Hawkins with cardiology was consulted during admission. Patient was discharged home in stable condition on 01/18 with plan to follow up with cardiology. Patient rates his current pain at a 4/10 in severity. He reports associated nausea. He denies vomiting. He denies SOB, palpitations, or diaphoresis. This pain is non-radiating. Patient admits to smoking at least 1/2 pack of cigarettes per day. He also admits to marijuana use. Patient does not have any significant medical history. There are no other complaints, symptoms, or concerns at this time. The HPI was provided by the patient. REVIEW OF SYSTEMS     Review of Systems   Constitutional: Negative for appetite change, chills and fever. HENT: Negative for congestion, ear pain, rhinorrhea and sore throat. Respiratory: Negative for cough, shortness of breath and wheezing. Cardiovascular: Positive for chest pain. Gastrointestinal: Positive for nausea. Negative for abdominal pain, constipation, diarrhea and vomiting. Genitourinary: Negative for difficulty urinating and dysuria. Musculoskeletal: Negative for arthralgias and back pain. Skin: Negative for color change and pallor. Neurological: Negative for dizziness, weakness, light-headedness, numbness and headaches.      PAST MEDICAL HISTORY    has no past medical history on file. SURGICAL HISTORY      has no past surgical history on file. CURRENT MEDICATIONS       Discharge Medication List as of 2020  9:02 AM      CONTINUE these medications which have NOT CHANGED    Details   therapeutic multivitamin-minerals (THERAGRAN-M) tablet Take 1 tablet by mouth daily. ALLERGIES     has No Known Allergies. FAMILY HISTORY     He indicated that his mother is . He indicated that his father is . family history includes Cancer in his father and mother. SOCIAL HISTORY    reports that he has been smoking cigarettes. He has been smoking about 0.50 packs per day. He has never used smokeless tobacco. He reports current alcohol use. He reports current drug use. Drug: Marijuana. PHYSICAL EXAM     INITIAL VITALS:  height is 6' 2\" (1.88 m) and weight is 149 lb (67.6 kg). His oral temperature is 98.3 °F (36.8 °C). His blood pressure is 119/87 and his pulse is 83. His respiration is 16 and oxygen saturation is 99%. Physical Exam  Vitals signs and nursing note reviewed. Constitutional:       Appearance: He is well-developed. He is not diaphoretic. HENT:      Head: Normocephalic and atraumatic. Right Ear: External ear normal.      Left Ear: External ear normal.   Eyes:      General: No scleral icterus. Right eye: No discharge. Left eye: No discharge. Conjunctiva/sclera: Conjunctivae normal.   Neck:      Musculoskeletal: Normal range of motion. Cardiovascular:      Rate and Rhythm: Normal rate and regular rhythm. Pulmonary:      Effort: Pulmonary effort is normal. No respiratory distress. Breath sounds: Normal breath sounds. No stridor. Chest:      Chest wall: No tenderness. Abdominal:      General: There is no distension. Palpations: Abdomen is soft. Tenderness: There is no tenderness. Musculoskeletal: Normal range of motion. Skin:     General: Skin is warm and dry. Findings: No erythema. Pam Lee, 01/28/20 3:04 PM    Provider:  I personally performed the services described in the documentation,reviewed and edited the documentation which was dictated to the scribe in my presence, and it accurately records my words and actions.     Mino Vargas CNP 1/22/20 3:04 PM        NORMA Prado - CESIA  01/28/20 1505

## 2020-02-10 ENCOUNTER — HOSPITAL ENCOUNTER (EMERGENCY)
Age: 45
Discharge: HOME | End: 2020-02-10
Attending: FAMILY MEDICINE
Payer: COMMERCIAL

## 2020-02-10 ENCOUNTER — APPOINTMENT (OUTPATIENT)
Dept: GENERAL RADIOLOGY | Age: 45
End: 2020-02-10
Payer: COMMERCIAL

## 2020-02-10 VITALS
RESPIRATION RATE: 15 BRPM | HEART RATE: 88 BPM | OXYGEN SATURATION: 100 % | SYSTOLIC BLOOD PRESSURE: 144 MMHG | DIASTOLIC BLOOD PRESSURE: 97 MMHG | TEMPERATURE: 98.6 F

## 2020-02-10 PROCEDURE — 74018 RADEX ABDOMEN 1 VIEW: CPT

## 2020-02-10 PROCEDURE — 99281 EMR DPT VST MAYX REQ PHY/QHP: CPT

## 2020-02-10 RX ORDER — DOCUSATE SODIUM 100 MG/1
100 CAPSULE, LIQUID FILLED ORAL 2 TIMES DAILY
Qty: 30 CAPSULE | Refills: 0 | Status: SHIPPED | OUTPATIENT
Start: 2020-02-10 | End: 2020-06-23

## 2020-02-10 RX ORDER — POLYETHYLENE GLYCOL 3350 17 G/17G
17 POWDER, FOR SOLUTION ORAL DAILY PRN
Qty: 30 EACH | Refills: 0 | Status: SHIPPED | OUTPATIENT
Start: 2020-02-10 | End: 2020-03-11

## 2020-02-10 ASSESSMENT — ENCOUNTER SYMPTOMS
TROUBLE SWALLOWING: 0
SHORTNESS OF BREATH: 0
CONSTIPATION: 1
COLOR CHANGE: 0
RHINORRHEA: 0
NAUSEA: 0
COUGH: 0
VOICE CHANGE: 0
ABDOMINAL PAIN: 0
BLOOD IN STOOL: 0
DIARRHEA: 1
SORE THROAT: 0
WHEEZING: 0
BACK PAIN: 0
VOMITING: 0

## 2020-02-10 NOTE — ED PROVIDER NOTES
[address]          CHIEF COMPLAINT       Chief Complaint   Patient presents with    Constipation       Nurses Notes reviewed and I agree except as noted inthe HPI. HISTORY OF PRESENT ILLNESS    Bri Loera is a 39 y.o. male who presents to the Emergency Department for the evaluation of constipation. Patient presents stating his last regular bowel movement was on 01/17. Stools have been loose, but are without blood/mucous. Patient denies nausea, vomiting, or abdominal pain. He denies chest pain or SOB. He denies fever/chills. He denies dysuria, hematuria, urgency, frequency, flank pain, or back pain. Patient does not have any significant medical history, specifically no history of bowel obstruction. He does not appear to be in any distress at this time. There are no other complaints, symptoms, or concerns. The HPI was provided by the patient. REVIEW OF SYSTEMS     Review of Systems   Constitutional: Negative for chills and fever. HENT: Negative for congestion, ear pain, rhinorrhea, sore throat, trouble swallowing and voice change. Eyes: Negative for visual disturbance. Respiratory: Negative for cough, shortness of breath and wheezing. Cardiovascular: Negative for chest pain. Gastrointestinal: Positive for constipation and diarrhea. Negative for abdominal pain, blood in stool, nausea and vomiting. Genitourinary: Negative for decreased urine volume, difficulty urinating and dysuria. Musculoskeletal: Negative for arthralgias, back pain, joint swelling and neck pain. Skin: Negative for color change, pallor and rash. Neurological: Negative for dizziness, weakness, numbness and headaches. Hematological: Negative for adenopathy. PAST MEDICAL HISTORY    has no past medical history on file. SURGICAL HISTORY      has no past surgical history on file.     CURRENT MEDICATIONS       Discharge Medication List as of 2/10/2020 12:15 DIFFERENTIAL DIAGNOSIS:   Constipation, SBO     RESULTS     EKG: All EKG's are interpreted by the Emergency Department Physician who either signs or Co-signs this chart in the absence of acardiologist.    None     RADIOLOGY: non-plain film images(s) such as CT, Ultrasound and MRI are read by the radiologist.    XR ABDOMEN (KUB) (SINGLE AP VIEW)   Final Result   Normal supine abdomen. No acute findings. **This report has been created using voice recognition software. It may contain minor errors which are inherent in voice recognition technology. **         Final report electronically signed by Dr. Marta Gonzalez on 2/10/2020 11:59 AM          LABS:   Labs Reviewed - No data to display    EMERGENCY DEPARTMENT COURSE:   Vitals:    Vitals:    02/10/20 1141 02/10/20 1142 02/10/20 1214   BP:  (!) 144/97    Pulse:  88    Resp: 18 15    Temp:   98.6 °F (37 °C)   SpO2:  100%      1142 KUB ordered    MDM  The pt was seen and evaluated by me. Within the department, I observed the pt's vital signs to be within acceptable range. Radiological studies were performed, results were reviewed with the patient. I observed the pt's condition to remain stable during the duration of their stay. I explained my proposed course of treatment to the pt, and they were amenable to my decision. Advised to begin MiraLAX and Colace as directed. Encouraged high-fiber diet education materials were given prior to stable discharge. They were discharged home, and they will return to the ED if their symptoms become more severe in nature, or otherwise change. CRITICAL CARE:   None      CONSULTS:  None     PROCEDURES:  None    FINAL IMPRESSION      1. Other constipation    2.  Elevated blood pressure reading          DISPOSITION/PLAN   Discharge     PATIENT REFERRED TO:  Fran Vázquez, APRN - CNP  [facility] [id number] [phone]    Schedule an appointment as soon as possible for a visit in 1 week        DISCHARGE MEDICATIONS:  Discharge Medication List as of 2/10/2020 12:15 PM      START taking these medications    Details   polyethylene glycol (MIRALAX) packet Take 17 g by mouth daily as needed for Constipation, Disp-30 each, R-0Print      docusate sodium (COLACE) 100 MG capsule Take 1 capsule by mouth 2 times daily, Disp-30 capsule, R-0Print             (Please note that portions of this note were completed with a voice recognition program.Efforts were made to edit the dictations but occasionally words are mis-transcribed.)    Scribe:  Signed by: Jesus Kerr, 2/10/2011:43 AM Scribing for and in the presence of Briana Minor MD    Provider:  I personally performed the services described in the documentation, reviewed and edited the documentation which was dictated to the scribe in mypresence, and it accurately records my words and actions.     Briana Minor MD 2/10/20 8:34 PM                      Briana iMnor MD  02/10/20 2034

## 2020-02-10 NOTE — ED NOTES
Patient states he was having slight amount of abdominal pain last night. States he took some pepto-bismol and the pain resolved. Abdomen is soft during exam. No guarding noted.       Otto Still, RN  02/10/20 8277

## 2020-03-24 ENCOUNTER — APPOINTMENT (OUTPATIENT)
Dept: GENERAL RADIOLOGY | Age: 45
End: 2020-03-24
Payer: COMMERCIAL

## 2020-03-24 ENCOUNTER — HOSPITAL ENCOUNTER (EMERGENCY)
Age: 45
Discharge: HOME | End: 2020-03-24
Attending: EMERGENCY MEDICINE
Payer: COMMERCIAL

## 2020-03-24 VITALS
TEMPERATURE: 99.4 F | HEIGHT: 74 IN | BODY MASS INDEX: 19.76 KG/M2 | OXYGEN SATURATION: 100 % | SYSTOLIC BLOOD PRESSURE: 115 MMHG | HEART RATE: 80 BPM | WEIGHT: 154 LBS | DIASTOLIC BLOOD PRESSURE: 74 MMHG | RESPIRATION RATE: 18 BRPM

## 2020-03-24 LAB
ANION GAP SERPL CALCULATED.3IONS-SCNC: 16 MEQ/L (ref 8–16)
BASOPHILS # BLD: 0.3 %
BASOPHILS ABSOLUTE: 0 THOU/MM3 (ref 0–0.1)
BUN BLDV-MCNC: 9 MG/DL (ref 7–22)
CALCIUM SERPL-MCNC: 9.2 MG/DL (ref 8.5–10.5)
CHLORIDE BLD-SCNC: 96 MEQ/L (ref 98–111)
CO2: 24 MEQ/L (ref 23–33)
CREAT SERPL-MCNC: 1.1 MG/DL (ref 0.4–1.2)
EKG ATRIAL RATE: 107 BPM
EKG P AXIS: 78 DEGREES
EKG P-R INTERVAL: 116 MS
EKG Q-T INTERVAL: 310 MS
EKG QRS DURATION: 78 MS
EKG QTC CALCULATION (BAZETT): 413 MS
EKG R AXIS: 71 DEGREES
EKG T AXIS: 67 DEGREES
EKG VENTRICULAR RATE: 107 BPM
EOSINOPHIL # BLD: 0 %
EOSINOPHILS ABSOLUTE: 0 THOU/MM3 (ref 0–0.4)
ERYTHROCYTE [DISTWIDTH] IN BLOOD BY AUTOMATED COUNT: 13.2 % (ref 11.5–14.5)
ERYTHROCYTE [DISTWIDTH] IN BLOOD BY AUTOMATED COUNT: 44.1 FL (ref 35–45)
GLUCOSE BLD-MCNC: 118 MG/DL (ref 70–108)
HCT VFR BLD CALC: 46.9 % (ref 42–52)
HEMOGLOBIN: 15.3 GM/DL (ref 14–18)
IMMATURE GRANS (ABS): 0.02 THOU/MM3 (ref 0–0.07)
IMMATURE GRANULOCYTES: 0.6 %
LYMPHOCYTES # BLD: 34.2 %
LYMPHOCYTES ABSOLUTE: 1.1 THOU/MM3 (ref 1–4.8)
MCH RBC QN AUTO: 29.7 PG (ref 26–33)
MCHC RBC AUTO-ENTMCNC: 32.6 GM/DL (ref 32.2–35.5)
MCV RBC AUTO: 91.1 FL (ref 80–94)
MONOCYTES # BLD: 13.4 %
MONOCYTES ABSOLUTE: 0.4 THOU/MM3 (ref 0.4–1.3)
NUCLEATED RED BLOOD CELLS: 0 /100 WBC
OSMOLALITY CALCULATION: 271.7 MOSMOL/KG (ref 275–300)
PLATELET # BLD: 194 THOU/MM3 (ref 130–400)
PMV BLD AUTO: 9.7 FL (ref 9.4–12.4)
POTASSIUM SERPL-SCNC: 4.2 MEQ/L (ref 3.5–5.2)
RBC # BLD: 5.15 MILL/MM3 (ref 4.7–6.1)
SEG NEUTROPHILS: 51.5 %
SEGMENTED NEUTROPHILS ABSOLUTE COUNT: 1.6 THOU/MM3 (ref 1.8–7.7)
SODIUM BLD-SCNC: 136 MEQ/L (ref 135–145)
WBC # BLD: 3.2 THOU/MM3 (ref 4.8–10.8)

## 2020-03-24 PROCEDURE — 36415 COLL VENOUS BLD VENIPUNCTURE: CPT

## 2020-03-24 PROCEDURE — 96360 HYDRATION IV INFUSION INIT: CPT

## 2020-03-24 PROCEDURE — 96374 THER/PROPH/DIAG INJ IV PUSH: CPT

## 2020-03-24 PROCEDURE — 99284 EMERGENCY DEPT VISIT MOD MDM: CPT

## 2020-03-24 PROCEDURE — 6360000002 HC RX W HCPCS: Performed by: EMERGENCY MEDICINE

## 2020-03-24 PROCEDURE — 96361 HYDRATE IV INFUSION ADD-ON: CPT

## 2020-03-24 PROCEDURE — 71045 X-RAY EXAM CHEST 1 VIEW: CPT

## 2020-03-24 PROCEDURE — 2709999900 HC NON-CHARGEABLE SUPPLY

## 2020-03-24 PROCEDURE — 93005 ELECTROCARDIOGRAM TRACING: CPT | Performed by: EMERGENCY MEDICINE

## 2020-03-24 PROCEDURE — 80048 BASIC METABOLIC PNL TOTAL CA: CPT

## 2020-03-24 PROCEDURE — 85025 COMPLETE CBC W/AUTO DIFF WBC: CPT

## 2020-03-24 PROCEDURE — 2580000003 HC RX 258: Performed by: EMERGENCY MEDICINE

## 2020-03-24 RX ORDER — KETOROLAC TROMETHAMINE 30 MG/ML
15 INJECTION, SOLUTION INTRAMUSCULAR; INTRAVENOUS ONCE
Status: COMPLETED | OUTPATIENT
Start: 2020-03-24 | End: 2020-03-24

## 2020-03-24 RX ORDER — IBUPROFEN 400 MG/1
400 TABLET ORAL EVERY 6 HOURS PRN
Qty: 20 TABLET | Refills: 0 | Status: SHIPPED | OUTPATIENT
Start: 2020-03-24 | End: 2022-07-29

## 2020-03-24 RX ORDER — 0.9 % SODIUM CHLORIDE 0.9 %
1000 INTRAVENOUS SOLUTION INTRAVENOUS ONCE
Status: COMPLETED | OUTPATIENT
Start: 2020-03-24 | End: 2020-03-24

## 2020-03-24 RX ADMIN — SODIUM CHLORIDE 1000 ML: 9 INJECTION, SOLUTION INTRAVENOUS at 10:57

## 2020-03-24 RX ADMIN — KETOROLAC TROMETHAMINE 15 MG: 30 INJECTION, SOLUTION INTRAMUSCULAR at 11:08

## 2020-03-24 RX ADMIN — SODIUM CHLORIDE 1000 ML: 9 INJECTION, SOLUTION INTRAVENOUS at 11:59

## 2020-03-24 ASSESSMENT — PAIN DESCRIPTION - LOCATION: LOCATION: EYE

## 2020-03-24 ASSESSMENT — PAIN DESCRIPTION - PAIN TYPE
TYPE: ACUTE PAIN
TYPE: ACUTE PAIN

## 2020-03-24 ASSESSMENT — ENCOUNTER SYMPTOMS
BACK PAIN: 0
SHORTNESS OF BREATH: 0
EYE PAIN: 0
DIARRHEA: 0
SINUS PRESSURE: 0
CHEST TIGHTNESS: 0
NAUSEA: 0
CONSTIPATION: 0
ABDOMINAL PAIN: 0
BLOOD IN STOOL: 0
SINUS PAIN: 0
RECTAL PAIN: 0
COUGH: 1

## 2020-03-24 ASSESSMENT — PAIN DESCRIPTION - DESCRIPTORS: DESCRIPTORS: PRESSURE

## 2020-03-24 ASSESSMENT — PAIN DESCRIPTION - ORIENTATION: ORIENTATION: RIGHT

## 2020-03-24 ASSESSMENT — PAIN SCALES - GENERAL
PAINLEVEL_OUTOF10: 5
PAINLEVEL_OUTOF10: 7
PAINLEVEL_OUTOF10: 7

## 2020-03-24 ASSESSMENT — PAIN DESCRIPTION - FREQUENCY: FREQUENCY: CONTINUOUS

## 2020-03-24 NOTE — ED NOTES
Upon first contact with patient this RN receives bedside shift report SHANTA Zelaya. Pt found resting on cot, with monitor in place. Pt to be dispo'd after fluids completed. Dr. Padilla Spar into room at this time.         [address] [place] [address], RN  03/24/20 0285

## 2020-03-24 NOTE — ED NOTES
Patient tested positive for influenza A and B at flu clinic      Pollo Deleon, SHANTA  03/24/20 1331

## 2020-03-24 NOTE — ED NOTES
Patient to ED for cough fever and body aches. Patient states cough started two weeks ago. Patient coming in from flu clinic via EMS. Patient states today he's felt light headed and dizzy. Patient has intermittent diaphoresis. Patient alert and oriented. Patient has easy and unlabored respirations with frequent non productive.  cough      Sylvie Eng RN  03/24/20 2445

## 2020-03-24 NOTE — ED NOTES
Bed: 003A  Expected date:   Expected time:   Means of arrival: [facility]  Comments:     Annabella Guardado RN  03/24/20 1022

## 2020-03-24 NOTE — ED PROVIDER NOTES
[place] ENCOUNTER          Pt Name: Meenakshi Pemberton  MRN: [id number]  [place] 1975  Date of evaluation: 3/24/2020  Physician: Temitope Ma MD, [facility], [address] [place] [address]       Chief Complaint   Patient presents with    Cough    Fever    Generalized Body Aches     History obtained from chart review and the patient. HISTORY OF PRESENT ILLNESS    HPI  Meenakshi Client is a 39 y.o. male who presents to the emergency department for evaluation of 4 days cough, fever, body aches, occasional lightheadedness and diaphoresis when standing at work. Patient states fever and cough started 4 days ago, as well as body aches. 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 Patient called his primary care physician's office and was advised to go to the urgent care/[place] [address] center for evaluation. At the urgent care he was found to have borderline low blood pressure and was advised to come to the emergency department. The patient has no other acute complaints at this time. REVIEW OF SYSTEMS   Review of Systems   Constitutional: Positive for chills, diaphoresis and fever. Negative for unexpected weight change. HENT: Negative for congestion, ear pain, nosebleeds, sinus pressure and sinus pain. Eyes: Negative for pain. Respiratory: Positive for cough. Negative for chest tightness and shortness of breath. Cardiovascular: Negative for chest pain. Gastrointestinal: Negative for abdominal pain, blood in stool, constipation, diarrhea, nausea and rectal pain. Endocrine: Negative for polydipsia and polyuria. Genitourinary: Negative for dysuria and flank pain. Musculoskeletal: Positive for myalgias. Negative for arthralgias, back pain and neck pain. Skin: Negative for rash. Neurological: Positive for light-headedness. Negative for tremors, seizures, weakness and headaches. All other systems reviewed and are negative. PAST MEDICAL AND SURGICAL HISTORY   History reviewed. No pertinent past medical history. History reviewed. No pertinent surgical history. MEDICATIONS   No current facility-administered medications for this encounter. Current Outpatient Medications:     ibuprofen (IBU) 400 MG tablet, Take 1 tablet by mouth every 6 hours as needed for Pain, Disp: 20 tablet, Rfl: 0    Dextromethorphan-Benzocaine 5-7.5 MG LOZG, Take 1 lozenge by mouth every 6 hours for 3 days, Disp: 20 lozenge, Rfl: 0    docusate sodium (COLACE) 100 MG capsule, Take 1 capsule by mouth 2 times daily, Disp: 30 capsule, Rfl: 0    therapeutic multivitamin-minerals (THERAGRAN-M) tablet, Take 1 tablet by mouth daily. , Disp: , Rfl:       SOCIAL HISTORY     Social History     Social History Narrative    Not on file     Social History     Tobacco Use    Smoking status: Current Every Day Smoker     Packs/day: 0.50     Types: Cigarettes    Smokeless tobacco: Never Used   Substance Use Topics    Alcohol use: Yes    Drug use: Yes     Types: Marijuana         ALLERGIES   No Known Allergies      FAMILY HISTORY     Family History   Problem Relation Age of Onset    Cancer Mother     Cancer Father          PREVIOUS RECORDS   Previous records reviewed: Patient seen earlier today at the urgent care center, tested positive for influenza A and influenza B. PHYSICAL EXAM     ED Triage Vitals [03/24/20 1046]   BP Temp Temp Source Pulse Resp SpO2 Height Weight   (!) 120/92 99.4 °F (37.4 °C) Oral 96 20 99 % 6' 2\" (1.88 m) 154 lb (69.9 kg)     Initial vital signs and nursing assessment reviewed and normal. Pulsoximetry is normal per my interpretation. Additional Vital Signs:  Vitals:    03/24/20 1327   BP: 115/74   Pulse: 80   Resp: 18   Temp:    SpO2: 100%       Physical Exam  Vitals signs and nursing note reviewed.    Constitutional: Chloride 96 (*)     Glucose 118 (*)     All other components within normal limits   OSMOLALITY - Abnormal; Notable for the following components:    Osmolality Calc 271.7 (*)     All other components within normal limits   GLOMERULAR FILTRATION RATE, ESTIMATED - Abnormal; Notable for the following components:    Est, Glom Filt Rate 88 (*)     All other components within normal limits   ANION GAP       Radiologic studies results:  XR CHEST PORTABLE   Final Result   Normal mobile chest.            **This report has been created using voice recognition software. It may contain minor errors which are inherent in voice recognition technology. **      Final report electronically signed by Dr. Coy Ricketts on 3/24/2020 11:16 AM          ED Medications administered this visit:   Medications   ketorolac (TORADOL) injection 15 mg (15 mg Intravenous Given 3/24/20 1108)   0.9 % sodium chloride bolus (0 mLs Intravenous Stopped 3/24/20 1158)   0.9 % sodium chloride bolus (1,000 mLs Intravenous New Bag 3/24/20 1159)         ED COURSE     ED Course as of Mar 24 1330   Tue Mar 24, 2020   1329 Feeling much better, able to stand and ambulate without symptoms. Received 2 L IV fluidsFeels comfortable going home, stressed the importance of self isolation for 2 weeks unless cleared by his primary care doctor. Will discharge. [DT]      ED Course User Index  [DT] Nette Arenas MD     Strict return precautions and follow up instructions were discussed with the patient prior to discharge, with which the patient agrees.       MEDICATION CHANGES     New Prescriptions    DEXTROMETHORPHAN-BENZOCAINE 5-7.5 MG LOZG    Take 1 lozenge by mouth every 6 hours for 3 days    IBUPROFEN (IBU) 400 MG TABLET    Take 1 tablet by mouth every 6 hours as needed for Pain         FINAL DISPOSITION     Final diagnoses:   Influenza with respiratory manifestation other than pneumonia   Dehydration     Condition: condition: good  Dispo: Discharge to home      This transcription was electronically signed. It was dictated by use of voice recognition software and electronically transcribed. The transcription may contain errors not detected in proofreading.         Romana Romp, MD  03/24/20 9562

## 2020-04-01 ENCOUNTER — HOSPITAL ENCOUNTER (EMERGENCY)
Age: 45
Discharge: HOME | End: 2020-04-02
Payer: COMMERCIAL

## 2020-04-01 LAB
ANION GAP SERPL CALCULATED.3IONS-SCNC: 15 MEQ/L (ref 8–16)
BASOPHILS # BLD: 0.4 %
BASOPHILS ABSOLUTE: 0 THOU/MM3 (ref 0–0.1)
BUN BLDV-MCNC: 10 MG/DL (ref 7–22)
CALCIUM SERPL-MCNC: 9.3 MG/DL (ref 8.5–10.5)
CHLORIDE BLD-SCNC: 104 MEQ/L (ref 98–111)
CO2: 23 MEQ/L (ref 23–33)
CREAT SERPL-MCNC: 0.8 MG/DL (ref 0.4–1.2)
EKG ATRIAL RATE: 74 BPM
EKG P AXIS: 63 DEGREES
EKG P-R INTERVAL: 138 MS
EKG Q-T INTERVAL: 390 MS
EKG QRS DURATION: 82 MS
EKG QTC CALCULATION (BAZETT): 432 MS
EKG R AXIS: 73 DEGREES
EKG T AXIS: 74 DEGREES
EKG VENTRICULAR RATE: 74 BPM
EOSINOPHIL # BLD: 0.9 %
EOSINOPHILS ABSOLUTE: 0 THOU/MM3 (ref 0–0.4)
ERYTHROCYTE [DISTWIDTH] IN BLOOD BY AUTOMATED COUNT: 12.5 % (ref 11.5–14.5)
ERYTHROCYTE [DISTWIDTH] IN BLOOD BY AUTOMATED COUNT: 41.4 FL (ref 35–45)
GFR SERPL CREATININE-BSD FRML MDRD: > 90 ML/MIN/1.73M2
GLUCOSE BLD-MCNC: 95 MG/DL (ref 70–108)
HCT VFR BLD CALC: 44.8 % (ref 42–52)
HEMOGLOBIN: 14.3 GM/DL (ref 14–18)
IMMATURE GRANS (ABS): 0.04 THOU/MM3 (ref 0–0.07)
IMMATURE GRANULOCYTES: 0.7 %
LYMPHOCYTES # BLD: 42.7 %
LYMPHOCYTES ABSOLUTE: 2.3 THOU/MM3 (ref 1–4.8)
MCH RBC QN AUTO: 28.9 PG (ref 26–33)
MCHC RBC AUTO-ENTMCNC: 31.9 GM/DL (ref 32.2–35.5)
MCV RBC AUTO: 90.7 FL (ref 80–94)
MONOCYTES # BLD: 11.9 %
MONOCYTES ABSOLUTE: 0.7 THOU/MM3 (ref 0.4–1.3)
NUCLEATED RED BLOOD CELLS: 0 /100 WBC
OSMOLALITY CALCULATION: 282 MOSMOL/KG (ref 275–300)
PLATELET # BLD: 407 THOU/MM3 (ref 130–400)
PMV BLD AUTO: 9 FL (ref 9.4–12.4)
POTASSIUM REFLEX MAGNESIUM: 4.4 MEQ/L (ref 3.5–5.2)
PRO-BNP: 14.5 PG/ML (ref 0–450)
RBC # BLD: 4.94 MILL/MM3 (ref 4.7–6.1)
SEG NEUTROPHILS: 43.4 %
SEGMENTED NEUTROPHILS ABSOLUTE COUNT: 2.4 THOU/MM3 (ref 1.8–7.7)
SODIUM BLD-SCNC: 142 MEQ/L (ref 135–145)
TROPONIN T: < 0.01 NG/ML
TROPONIN T: < 0.01 NG/ML
WBC # BLD: 5.5 THOU/MM3 (ref 4.8–10.8)

## 2020-04-01 PROCEDURE — 99284 EMERGENCY DEPT VISIT MOD MDM: CPT

## 2020-04-01 PROCEDURE — 80048 BASIC METABOLIC PNL TOTAL CA: CPT

## 2020-04-01 PROCEDURE — 36415 COLL VENOUS BLD VENIPUNCTURE: CPT

## 2020-04-01 PROCEDURE — 6370000000 HC RX 637 (ALT 250 FOR IP): Performed by: NURSE PRACTITIONER

## 2020-04-01 PROCEDURE — 84484 ASSAY OF TROPONIN QUANT: CPT

## 2020-04-01 PROCEDURE — 93005 ELECTROCARDIOGRAM TRACING: CPT | Performed by: NURSE PRACTITIONER

## 2020-04-01 PROCEDURE — 83880 ASSAY OF NATRIURETIC PEPTIDE: CPT

## 2020-04-01 PROCEDURE — 85025 COMPLETE CBC W/AUTO DIFF WBC: CPT

## 2020-04-01 RX ORDER — ASPIRIN 81 MG/1
324 TABLET, CHEWABLE ORAL ONCE
Status: COMPLETED | OUTPATIENT
Start: 2020-04-01 | End: 2020-04-01

## 2020-04-01 RX ADMIN — ASPIRIN 81 MG 324 MG: 81 TABLET ORAL at 21:54

## 2020-04-01 ASSESSMENT — PAIN SCALES - GENERAL
PAINLEVEL_OUTOF10: 8
PAINLEVEL_OUTOF10: 10

## 2020-04-01 ASSESSMENT — PAIN DESCRIPTION - PAIN TYPE
TYPE: ACUTE PAIN
TYPE: ACUTE PAIN

## 2020-04-01 ASSESSMENT — ENCOUNTER SYMPTOMS
NAUSEA: 0
SHORTNESS OF BREATH: 0
DIARRHEA: 0
VOMITING: 0
COUGH: 0
ABDOMINAL PAIN: 0

## 2020-04-01 ASSESSMENT — PAIN DESCRIPTION - LOCATION
LOCATION: CHEST
LOCATION: CHEST

## 2020-04-01 ASSESSMENT — PAIN DESCRIPTION - DESCRIPTORS
DESCRIPTORS: ACHING
DESCRIPTORS: ACHING

## 2020-04-01 ASSESSMENT — PAIN DESCRIPTION - ORIENTATION: ORIENTATION: LEFT

## 2020-04-02 VITALS
BODY MASS INDEX: 19.89 KG/M2 | HEART RATE: 78 BPM | SYSTOLIC BLOOD PRESSURE: 107 MMHG | HEIGHT: 74 IN | OXYGEN SATURATION: 96 % | RESPIRATION RATE: 15 BRPM | TEMPERATURE: 98.6 F | WEIGHT: 155 LBS | DIASTOLIC BLOOD PRESSURE: 92 MMHG

## 2020-04-02 PROCEDURE — 6370000000 HC RX 637 (ALT 250 FOR IP): Performed by: NURSE PRACTITIONER

## 2020-04-02 PROCEDURE — 93010 ELECTROCARDIOGRAM REPORT: CPT | Performed by: NUCLEAR MEDICINE

## 2020-04-02 RX ORDER — IBUPROFEN 800 MG/1
800 TABLET ORAL ONCE
Status: COMPLETED | OUTPATIENT
Start: 2020-04-02 | End: 2020-04-02

## 2020-04-02 RX ADMIN — IBUPROFEN 800 MG: 800 TABLET, FILM COATED ORAL at 00:48

## 2020-04-02 ASSESSMENT — PAIN DESCRIPTION - LOCATION: LOCATION: CHEST

## 2020-04-02 ASSESSMENT — PAIN DESCRIPTION - DESCRIPTORS: DESCRIPTORS: SHARP

## 2020-04-02 ASSESSMENT — PAIN SCALES - GENERAL
PAINLEVEL_OUTOF10: 8
PAINLEVEL_OUTOF10: 8

## 2020-04-02 NOTE — ED NOTES
ED nurse-to-nurse bedside report    Chief Complaint   Patient presents with    Chest Pain      LOC: alert and orientated to name, place, date  Vital signs   Vitals:    04/01/20 2133 04/01/20 2155   BP: 115/87 109/85   Pulse: 75 88   Resp: 18 18   Temp: 98.6 °F (37 °C)    TempSrc: Oral    SpO2: 100% 99%   Weight: 155 lb (70.3 kg)    Height: 6' 2\" (1.88 m)       Pain:    Pain Interventions: aspirin given  Pain Goal: 5  Oxygen: No    Current needs required 2nd troponin needs drawn   Telemetry: Yes  LDAs:    Continuous Infusions:   Mobility: Independent  Lima Fall Risk Score: No flowsheet data found.   Fall Interventions: siderails up  Report given to: Tan Duff RN  04/01/20 2389

## 2020-04-02 NOTE — ED NOTES
Patient resting in bed, watching television. Denies any needs at this time.       Ang Vickers, SHANTA  04/02/20 0025

## 2020-04-06 ENCOUNTER — OFFICE VISIT (OUTPATIENT)
Dept: CARDIOLOGY CLINIC | Age: 45
End: 2020-04-06
Payer: COMMERCIAL

## 2020-04-06 VITALS
BODY MASS INDEX: 19.33 KG/M2 | WEIGHT: 150.6 LBS | HEART RATE: 80 BPM | DIASTOLIC BLOOD PRESSURE: 70 MMHG | HEIGHT: 74 IN | SYSTOLIC BLOOD PRESSURE: 116 MMHG

## 2020-04-06 PROBLEM — R07.89 CHEST PAIN, ATYPICAL: Status: ACTIVE | Noted: 2020-04-06

## 2020-04-06 LAB — GFR SERPL CREATININE-BSD FRML MDRD: 73 ML/MIN/1.73M2

## 2020-04-06 PROCEDURE — 99204 OFFICE O/P NEW MOD 45 MIN: CPT | Performed by: INTERNAL MEDICINE

## 2020-04-06 NOTE — PROGRESS NOTES
Chief Complaint   Patient presents with    New Patient    Follow-Up from Hospital       Patient Active Problem List   Diagnosis    Chest pain    Chest pain, atypical     Was sent from ED form cp    Chest Pain   Patient complains of chest pain. For the last 3 months . Retrosternal, sudden in onset, Symptoms have resolved since that time. The patient's pain is intermittent. The patient describes the pain as sharp and radiates to the left arm. Patient rates pain as a 9/10 in intensity. Associated symptoms are: none. Aggravating factors are: deep inspiration and arm movement. Alleviating factors are: NSAIDs. CP last constant for few hours    Denied sob, HA, dizziness, palpitations or swelling      FHX  None for CAD    Smokes 1 ppd for 24 yrs    History reviewed. No pertinent surgical history. No Known Allergies     Family History   Problem Relation Age of Onset    Cancer Mother     Cancer Father         Social History     Socioeconomic History    Marital status: Single     Spouse name: Not on file    Number of children: Not on file    Years of education: Not on file    Highest education level: Not on file   Occupational History    Not on file   Social Needs    Financial resource strain: Not on file    Food insecurity     Worry: Not on file     Inability: Not on file    Transportation needs     Medical: Not on file     Non-medical: Not on file   Tobacco Use    Smoking status: Current Every Day Smoker     Packs/day: 1.00     Types: Cigarettes    Smokeless tobacco: Never Used   Substance and Sexual Activity    Alcohol use:  Yes    Drug use: Not Currently     Types: Marijuana    Sexual activity: Not on file   Lifestyle    Physical activity     Days per week: Not on file     Minutes per session: Not on file    Stress: Not on file   Relationships    Social connections     Talks on phone: Not on file     Gets together: Not on file     Attends Nondenominational service: Not on file     Active member of club or organization: Not on file     Attends meetings of clubs or organizations: Not on file     Relationship status: Not on file    Intimate partner violence     Fear of current or ex partner: Not on file     Emotionally abused: Not on file     Physically abused: Not on file     Forced sexual activity: Not on file   Other Topics Concern    Not on file   Social History Narrative    Not on file       Current Outpatient Medications   Medication Sig Dispense Refill    ibuprofen (IBU) 400 MG tablet Take 1 tablet by mouth every 6 hours as needed for Pain 20 tablet 0    docusate sodium (COLACE) 100 MG capsule Take 1 capsule by mouth 2 times daily 30 capsule 0    therapeutic multivitamin-minerals (THERAGRAN-M) tablet Take 1 tablet by mouth daily. No current facility-administered medications for this visit. Review of Systems -     General ROS: negative  Psychological ROS: negative  Hematological and Lymphatic ROS: No history of blood clots or bleeding disorder. Respiratory ROS: no cough,  or wheezing, the rest see HPI  Cardiovascular ROS: See HPI  Gastrointestinal ROS: negative  Genito-Urinary ROS: no dysuria, trouble voiding, or hematuria  Musculoskeletal ROS: negative  Neurological ROS: no TIA or stroke symptoms  Dermatological ROS: negative      Blood pressure 116/70, pulse 80, height 6' 2\" (1.88 m), weight 150 lb 9.6 oz (68.3 kg).         Physical Examination:    General appearance - alert, well appearing, and in no distress  HEENT- Pink conjunctiva  , Non-icteri sclera,PERRLA  Mental status - alert, oriented to person, place, and time  Neck - supple, no significant adenopathy, no JVD, or carotid bruits  Chest - clear to auscultation, no wheezes, rales or rhonchi, symmetric air entry  Heart - normal rate, regular rhythm, normal S1, S2, no murmurs, rubs, clicks or gallops  Abdomen - soft, nontender, nondistended, no masses or organomegaly  CONCHITA- no CVA or flank tenderness, no suprapubic

## 2020-07-14 ENCOUNTER — OFFICE VISIT (OUTPATIENT)
Dept: CARDIOLOGY CLINIC | Age: 45
End: 2020-07-14
Payer: COMMERCIAL

## 2020-07-14 VITALS
WEIGHT: 152.8 LBS | SYSTOLIC BLOOD PRESSURE: 131 MMHG | DIASTOLIC BLOOD PRESSURE: 89 MMHG | HEART RATE: 83 BPM | HEIGHT: 74 IN | BODY MASS INDEX: 19.61 KG/M2

## 2020-07-14 PROBLEM — Z87.898 HISTORY OF CHEST PAIN: Status: ACTIVE | Noted: 2020-07-14

## 2020-07-14 PROCEDURE — 99214 OFFICE O/P EST MOD 30 MIN: CPT | Performed by: INTERNAL MEDICINE

## 2020-07-14 RX ORDER — OMEPRAZOLE 20 MG/1
1 CAPSULE, DELAYED RELEASE ORAL
COMMUNITY
Start: 2020-06-09 | End: 2022-07-29

## 2020-07-14 NOTE — PROGRESS NOTES
Chief Complaint   Patient presents with    3 Month Follow-Up       Patient Active Problem List   Diagnosis    Chest pain    Chest pain, atypical    History of chest pain Atypical     Was sent from ED form cp    3 month follow up. No more Chest pain   Some chest discomfort once in a while    Feeling much better    Denied sob, HA, dizziness, palpitations or swelling      FHX  None for CAD    Smokes 1 ppd for 24 yrs    History reviewed. No pertinent surgical history. No Known Allergies     Family History   Problem Relation Age of Onset    Colon Cancer Mother         passed at 36    Prostate Cancer Father         Social History     Socioeconomic History    Marital status: Single     Spouse name: Not on file    Number of children: Not on file    Years of education: Not on file    Highest education level: Not on file   Occupational History    Not on file   Social Needs    Financial resource strain: Not on file    Food insecurity     Worry: Not on file     Inability: Not on file    Transportation needs     Medical: Not on file     Non-medical: Not on file   Tobacco Use    Smoking status: Current Every Day Smoker     Packs/day: 1.00     Types: Cigarettes    Smokeless tobacco: Never Used   Substance and Sexual Activity    Alcohol use:  Yes    Drug use: Not Currently     Types: Marijuana    Sexual activity: Not on file   Lifestyle    Physical activity     Days per week: Not on file     Minutes per session: Not on file    Stress: Not on file   Relationships    Social connections     Talks on phone: Not on file     Gets together: Not on file     Attends Taoist service: Not on file     Active member of club or organization: Not on file     Attends meetings of clubs or organizations: Not on file     Relationship status: Not on file    Intimate partner violence     Fear of current or ex partner: Not on file     Emotionally abused: Not on file     Physically abused: Not on file     Forced sexual activity: Not on file   Other Topics Concern    Not on file   Social History Narrative    Not on file       Current Outpatient Medications   Medication Sig Dispense Refill    omeprazole (PRILOSEC) 20 MG delayed release capsule 1 capsule      ibuprofen (IBU) 400 MG tablet Take 1 tablet by mouth every 6 hours as needed for Pain 20 tablet 0    therapeutic multivitamin-minerals (THERAGRAN-M) tablet Take 1 tablet by mouth daily. No current facility-administered medications for this visit. Review of Systems -     General ROS: negative  Psychological ROS: negative  Hematological and Lymphatic ROS: No history of blood clots or bleeding disorder. Respiratory ROS: no cough,  or wheezing, the rest see HPI  Cardiovascular ROS: See HPI  Gastrointestinal ROS: negative  Genito-Urinary ROS: no dysuria, trouble voiding, or hematuria  Musculoskeletal ROS: negative  Neurological ROS: no TIA or stroke symptoms  Dermatological ROS: negative      Blood pressure 131/89, pulse 83, height 6' 2\" (1.88 m), weight 152 lb 12.8 oz (69.3 kg).         Physical Examination:    General appearance - alert, well appearing, and in no distress  HEENT- Pink conjunctiva  , Non-icteri sclera,PERRLA  Mental status - alert, oriented to person, place, and time  Neck - supple, no significant adenopathy, no JVD, or carotid bruits  Chest - clear to auscultation, no wheezes, rales or rhonchi, symmetric air entry  Heart - normal rate, regular rhythm, normal S1, S2, no murmurs, rubs, clicks or gallops  Abdomen - soft, nontender, nondistended, no masses or organomegaly  CONCHITA- no CVA or flank tenderness, no suprapubic tenderness  Neurological - alert, oriented, normal speech, no focal findings or movement disorder noted  Musculoskeletal/limbs - no joint tenderness, deformity or swelling   - peripheral pulses normal, no pedal edema, no clubbing or cyanosis  Skin - normal coloration and turgor, no rashes, no suspicious skin lesions noted  Psych- tracer uptake. The nuclear images is not suggestive for myocardial ischemia.        Conclusions        Summary    Exercise EKG stress test is not suggestive for ischemia.    This Nuclear Medicine study was negative for ischemia .        Signatures        ----------------------------------------------------------------    Electronically signed by Justo Hoffman MD (Interpreting    Cardiologist) on 01/18/2020 at 14:34    ----------------------------------------------------------------         Ekg4/6/2020  NSR, NO acute abn       Conclusions      Summary   Normal left ventricle size and systolic function. Ejection fraction was   estimated at 55 %. There were no regional left ventricular wall motion   abnormalities and wall thickness was within normal limits. Signature      ----------------------------------------------------------------   Electronically signed by Justo Hoffman MD (Interpreting   physician) on 01/18/2020 at 06:20 PM    Normal sinus rhythm  Normal ECG  When compared with ECG of 24-MAR-2020 10:30,  No significant change was found  Confirmed by NIGEL CHARLES ([id number]) on 4/2/2020 8:03:39 AM  Assessment       Diagnosis Orders   1.  History of chest pain Atypical           Plan     meds and labs reviewed    Ed record reviewed    ekg no abnromality  Troponin Negative    The chest pain is noncardiac  In nature- better  It pleuritic  And cp free since 04/01/2020  Has been recently treated  And DX for Flu 03/24/2020  Better now no cp    Extensive discussion  Has been done with the patient    Pat to have EGD    D/w the pat    Feel better    The chest pain better and gone    Consider Coronary CTA if any further recurrence       I spent 25 minutes involved in face-to-face discussion of medical issues, prognosis, record review  and plan with the patient today and more than 50% of the time was spent on counseling and coordination of care          RTC in 6 months      Sergio [facility]

## 2020-07-25 ENCOUNTER — HOSPITAL ENCOUNTER (OUTPATIENT)
Age: 45
Discharge: HOME | End: 2020-07-25
Payer: COMMERCIAL

## 2020-07-25 PROCEDURE — U0003 INFECTIOUS AGENT DETECTION BY NUCLEIC ACID (DNA OR RNA); SEVERE ACUTE RESPIRATORY SYNDROME CORONAVIRUS 2 (SARS-COV-2) (CORONAVIRUS DISEASE [COVID-19]), AMPLIFIED PROBE TECHNIQUE, MAKING USE OF HIGH THROUGHPUT TECHNOLOGIES AS DESCRIBED BY CMS-2020-01-R: HCPCS

## 2020-07-27 LAB — SARS-COV-2: NOT DETECTED

## 2020-07-29 ENCOUNTER — ANESTHESIA (OUTPATIENT)
Dept: ENDOSCOPY | Age: 45
End: 2020-07-29
Payer: COMMERCIAL

## 2020-07-29 ENCOUNTER — HOSPITAL ENCOUNTER (OUTPATIENT)
Age: 45
Setting detail: OUTPATIENT SURGERY
Discharge: HOME | End: 2020-07-29
Attending: INTERNAL MEDICINE | Admitting: INTERNAL MEDICINE
Payer: COMMERCIAL

## 2020-07-29 ENCOUNTER — ANESTHESIA EVENT (OUTPATIENT)
Dept: ENDOSCOPY | Age: 45
End: 2020-07-29
Payer: COMMERCIAL

## 2020-07-29 VITALS
DIASTOLIC BLOOD PRESSURE: 71 MMHG | RESPIRATION RATE: 18 BRPM | HEART RATE: 91 BPM | BODY MASS INDEX: 19.38 KG/M2 | HEIGHT: 74 IN | WEIGHT: 151 LBS | TEMPERATURE: 96.9 F | SYSTOLIC BLOOD PRESSURE: 128 MMHG | OXYGEN SATURATION: 97 %

## 2020-07-29 VITALS
DIASTOLIC BLOOD PRESSURE: 65 MMHG | RESPIRATION RATE: 9 BRPM | OXYGEN SATURATION: 100 % | SYSTOLIC BLOOD PRESSURE: 106 MMHG

## 2020-07-29 PROCEDURE — 3609010600 HC COLONOSCOPY POLYPECTOMY SNARE/COLD BIOPSY: Performed by: INTERNAL MEDICINE

## 2020-07-29 PROCEDURE — 3700000001 HC ADD 15 MINUTES (ANESTHESIA): Performed by: INTERNAL MEDICINE

## 2020-07-29 PROCEDURE — 2720000010 HC SURG SUPPLY STERILE: Performed by: INTERNAL MEDICINE

## 2020-07-29 PROCEDURE — 3609012400 HC EGD TRANSORAL BIOPSY SINGLE/MULTIPLE: Performed by: INTERNAL MEDICINE

## 2020-07-29 PROCEDURE — 7100000011 HC PHASE II RECOVERY - ADDTL 15 MIN: Performed by: INTERNAL MEDICINE

## 2020-07-29 PROCEDURE — 2500000003 HC RX 250 WO HCPCS: Performed by: NURSE ANESTHETIST, CERTIFIED REGISTERED

## 2020-07-29 PROCEDURE — 88305 TISSUE EXAM BY PATHOLOGIST: CPT

## 2020-07-29 PROCEDURE — 2580000003 HC RX 258: Performed by: INTERNAL MEDICINE

## 2020-07-29 PROCEDURE — 6360000002 HC RX W HCPCS: Performed by: NURSE ANESTHETIST, CERTIFIED REGISTERED

## 2020-07-29 PROCEDURE — 7100000010 HC PHASE II RECOVERY - FIRST 15 MIN: Performed by: INTERNAL MEDICINE

## 2020-07-29 PROCEDURE — 3700000000 HC ANESTHESIA ATTENDED CARE: Performed by: INTERNAL MEDICINE

## 2020-07-29 PROCEDURE — 2709999900 HC NON-CHARGEABLE SUPPLY: Performed by: INTERNAL MEDICINE

## 2020-07-29 RX ORDER — SODIUM CHLORIDE 450 MG/100ML
INJECTION, SOLUTION INTRAVENOUS CONTINUOUS
Status: DISCONTINUED | OUTPATIENT
Start: 2020-07-29 | End: 2020-07-29 | Stop reason: HOSPADM

## 2020-07-29 RX ORDER — LIDOCAINE HYDROCHLORIDE 20 MG/ML
INJECTION, SOLUTION INFILTRATION; PERINEURAL PRN
Status: DISCONTINUED | OUTPATIENT
Start: 2020-07-29 | End: 2020-07-29 | Stop reason: SDUPTHER

## 2020-07-29 RX ORDER — PROPOFOL 10 MG/ML
INJECTION, EMULSION INTRAVENOUS PRN
Status: DISCONTINUED | OUTPATIENT
Start: 2020-07-29 | End: 2020-07-29 | Stop reason: SDUPTHER

## 2020-07-29 RX ADMIN — SODIUM CHLORIDE: 4.5 INJECTION, SOLUTION INTRAVENOUS at 06:54

## 2020-07-29 RX ADMIN — PROPOFOL 350 MG: 10 INJECTION, EMULSION INTRAVENOUS at 08:24

## 2020-07-29 RX ADMIN — LIDOCAINE HYDROCHLORIDE 100 MG: 20 INJECTION, SOLUTION INFILTRATION; PERINEURAL at 08:24

## 2020-07-29 ASSESSMENT — PAIN - FUNCTIONAL ASSESSMENT: PAIN_FUNCTIONAL_ASSESSMENT: 0-10

## 2020-07-29 ASSESSMENT — PAIN SCALES - GENERAL
PAINLEVEL_OUTOF10: 1
PAINLEVEL_OUTOF10: 3

## 2020-07-29 ASSESSMENT — LIFESTYLE VARIABLES: SMOKING_STATUS: 1

## 2020-07-29 NOTE — PROGRESS NOTES
Photos taken, scope used GIF #571, BIOPSY TAKEN WITH COLD FORCEPS, SPECIMENS LABELED & 1 JAR SENT TO LAB. EGD COMPLETED, PT TOLERATED WELL.

## 2020-07-29 NOTE — ANESTHESIA POSTPROCEDURE EVALUATION
Department of Anesthesiology  Postprocedure Note    Patient: Dottie Alejo  MRN: [id number]  YOB: 1975  Date of evaluation: 7/29/2020  Time:  10:03 AM     Procedure Summary     Date:  07/29/20 Room / Location:  [address] [place] [place] [address] / [address] [place] [address]    Anesthesia Start:  5517 Anesthesia Stop:  4024    Procedures:       COLONOSCOPY POLYPECTOMY SNARE/COLD BIOPSY (Left Anus)      EGD BIOPSY (Left ) Diagnosis:  (CHEST PAIN OF UNERTAIN ETIOLOG, PERIUMBILICAL PAIN, GASTROESOPHAGEAL REFLUX DISEASE, NSAID LONG TERM USE)    Surgeon:  Reuben Wiley MD Responsible Provider:  Tad Catalan DO    Anesthesia Type:  general, MAC ASA Status:  2          Anesthesia Type: general, MAC    Brendan Phase I: Brendan Score: 9    Brendan Phase II: Brendan Score: 10    Last vitals: Reviewed and per EMR flowsheets.        Anesthesia Post Evaluation    Patient location during evaluation: bedside  Patient participation: complete - patient participated  Level of consciousness: awake and alert  Pain score: 0  Nausea & Vomiting: no nausea and no vomiting  Complications: no  Cardiovascular status: hemodynamically stable  Respiratory status: acceptable, room air and spontaneous ventilation  Hydration status: euvolemic

## 2020-07-29 NOTE — PROGRESS NOTES
Patient in phase 2 endo, patient awake and talking. Adwoa Germain has his daughter at bedside. Dr. Lynne Buckner talked with Jv (daughter) and Adwoa Germain. Samson tolerating oral fluids. Adwoa Germain is passing air.

## 2020-07-29 NOTE — PRE SEDATION
Sedation/Analgesia History & Physical    Patient: Harris Almodovar : 1975  Med Rec#: [id number] Acc#: [id number]   Provider Performing Procedure: Nubia Donaldson  Primary Care Physician: NORMA Reeves CNP    PRE-PROCEDURE   Full CODE [x]Yes  DNR-CCA/DNR-CC []Yes   Brief History/Pre-Procedure Diagnosis:\    1. Encounter for diagnostic colonoscopy due to change in bowel habits  polyethylene glycol (GLYCOLAX) 17 GM/SCOOP powder     senna (SENNA LAX) 8.6 MG tablet     COLONOSCOPY W/ OR W/O BIOPSY   2. Alternating constipation and diarrhea  polyethylene glycol (GLYCOLAX) 17 GM/SCOOP powder     senna (SENNA LAX) 8.6 MG tablet     COLONOSCOPY W/ OR W/O BIOPSY   3. Family history of colon cancer in mother  polyethylene glycol (GLYCOLAX) 17 GM/SCOOP powder     senna (SENNA LAX) 8.6 MG tablet     COLONOSCOPY W/ OR W/O BIOPSY     age 45   2. Chest pain of uncertain etiology  ESOPHAGOSCOPY / EGD   5. Periumbilical pain  polyethylene glycol (GLYCOLAX) 17 GM/SCOOP powder     senna (SENNA LAX) 8.6 MG tablet     COLONOSCOPY W/ OR W/O BIOPSY     ESOPHAGOSCOPY / EGD   6. Gastroesophageal reflux disease, esophagitis presence not specified  ESOPHAGOSCOPY / EGD   7. NSAID long-term use  ESOPHAGOSCOPY / EGD   8. Tobacco use  ESOPHAGOSCOPY / EGD   9. Alcohol use  ESOPHAGOSCOPY / EGD               MEDICAL HISTORY    []Additional information:       has a past medical history of Heartburn and Unintentional weight loss.     SOCIAL HISTORY  Social History     Tobacco History     Smoking Status  Current Every Day Smoker Smoking Frequency  1 pack/day Smoking Tobacco Type  Cigarettes    Smokeless Tobacco Use  Never Used          Alcohol History     Alcohol Use Status  Yes          Drug Use     Drug Use Status  Not Currently Types  Marijuana          Sexual Activity     Sexually Active  Not Asked                FAMILY HISTORY     Family History   Problem Relation Age of Onset    Colon Cancer Mother         passed at 36   [facility] Prostate Cancer Father        SURGICAL HISTORY   has no past surgical history on file. Additional information:       ALLERGIES   Allergies as of 06/25/2020    (No Known Allergies)     Additional information:       MEDICATIONS   Coumadin Use Last 7 Days [x]No []Yes  Antiplatelet drug therapy use last 7 days  [x]No []Yes  Other anticoagulant use last 7 days  [x]No []Yes    Current Facility-Administered Medications:     0.45 % sodium chloride infusion, , Intravenous, Continuous, Nataly Oneal MD, Last Rate: 75 mL/hr at 07/29/20 0654  Prior to Admission medications    Medication Sig Start Date End Date Taking? Authorizing Provider   omeprazole (PRILOSEC) 20 MG delayed release capsule 1 capsule 6/9/20  Yes Historical Provider, MD   therapeutic multivitamin-minerals (THERAGRAN-M) tablet Take 1 tablet by mouth daily. Yes Historical Provider, MD   ibuprofen (IBU) 400 MG tablet Take 1 tablet by mouth every 6 hours as needed for Pain 3/24/20   Trellis Koyanagi, MD     Additional information:       PHYSICAL:   Heart:  [x]Regular rate and rhythm  []Other:    Lungs:  [x]Clear    []Other:    Abdomen: [x]Soft    []Other:    Mental Status: [x]Alert & Oriented  []Other:        PLANNED PROCEDURE   [x]EGD  [x]Colonoscopy []Flex Sigmoid     Consent: I have discussed with the patient and/or the patient representative the indication, alternatives, and the possible risks and/or complications of the planned procedure and the anesthesia methods. The patient and/or patient representative appear to understand and agree to proceed. SEDATION  Please see anesthesia note. The medication Planned :  Planned agent:[x]Midazolam []Meperidine [x]Sublimaze []Morphine  []Diazepam  [x]Propofol     Airway Assessment:   See anesthesia no please     Monitoring and Safety: The patient will be placed on a cardiac monitor and vital signs, pulse oximetry and level of consciousness will be continuously evaluated throughout the procedure.  The patient will be closely monitored until recovery from the medications is complete and the patient has returned to baseline status. Respiratory therapy will be on standby during the procedure. [x]Pre-procedure diagnostic studies complete and results available. Comment:    [x]Previous sedation/anesthesia experiences assessed. Comment:    [x]The patient is an appropriate candidate to undergo the planned procedure sedation and anesthesia. (Refer to nursing sedation/analgesia documentation record)  [x]Formulation and discussion of sedation/procedure plan, risks, and expectations with patient and/or responsible adult completed. [x]Patient examined immediately prior to the procedure.  (Refer to nursing sedation/analgesia documentation record)    Tomás Wiley MD   Electronically signed 7/29/2020 at 8:24 AM

## 2020-07-29 NOTE — ANESTHESIA PRE PROCEDURE
Department of Anesthesiology  Preprocedure Note       Name:  Yair Wagner   Age:  39 y.o.  :  1975                                          MRN:  [id number]         Date:  2020      Surgeon: Jerica Montanez):  Kevin Huynh MD    Procedure: Procedure(s):  COLONOSCOPY  EGD ESOPHAGOGASTRODUODENOSCOPY    Medications prior to admission:   Prior to Admission medications    Medication Sig Start Date End Date Taking? Authorizing Provider   omeprazole (PRILOSEC) 20 MG delayed release capsule 1 capsule 20  Yes Historical Provider, MD   therapeutic multivitamin-minerals (THERAGRAN-M) tablet Take 1 tablet by mouth daily. Yes Historical Provider, MD   ibuprofen (IBU) 400 MG tablet Take 1 tablet by mouth every 6 hours as needed for Pain 3/24/20   Ben Murillo MD       Current medications:    Current Facility-Administered Medications   Medication Dose Route Frequency Provider Last Rate Last Dose    0.45 % sodium chloride infusion   Intravenous Continuous Kevin Huynh MD 75 mL/hr at 20 1599         Allergies:  No Known Allergies    Problem List:    Patient Active Problem List   Diagnosis Code    Chest pain R07.9    Chest pain, atypical R07.89    History of chest pain Atypical Z87.898       Past Medical History:        Diagnosis Date    Heartburn     Unintentional weight loss        Past Surgical History:  No past surgical history on file. Social History:    Social History     Tobacco Use    Smoking status: Current Every Day Smoker     Packs/day: 1.00     Types: Cigarettes    Smokeless tobacco: Never Used   Substance Use Topics    Alcohol use:  Yes                                Ready to quit: Not Answered  Counseling given: Not Answered      Vital Signs (Current):   Vitals:    20 0648   BP: 129/86   Pulse: 87   Resp: 16   Temp: 36.1 °C (96.9 °F)   TempSrc: Temporal   SpO2: 100%   Weight: 151 lb (68.5 kg)   Height: 6' 2\" (1.88 m)                                              BP Readings from Last 3 Encounters:   07/29/20 129/86   07/14/20 131/89   06/23/20 131/89       NPO Status: Time of last liquid consumption: 2300                        Time of last solid consumption: 1700                        Date of last liquid consumption: 07/28/20                        Date of last solid food consumption: 07/27/20    BMI:   Wt Readings from Last 3 Encounters:   07/29/20 151 lb (68.5 kg)   07/14/20 152 lb 12.8 oz (69.3 kg)   06/23/20 156 lb (70.8 kg)     Body mass index is 19.39 kg/m². CBC:   Lab Results   Component Value Date    WBC 5.5 04/01/2020    RBC 4.94 04/01/2020    HGB 14.3 04/01/2020    HCT 44.8 04/01/2020    MCV 90.7 04/01/2020    RDW 13.6 09/27/2013     04/01/2020       CMP:   Lab Results   Component Value Date     04/01/2020    K 4.4 04/01/2020     04/01/2020    CO2 23 04/01/2020    BUN 10 04/01/2020    CREATININE 0.8 04/01/2020    LABGLOM >90 04/01/2020    GLUCOSE 95 04/01/2020    PROT 7.2 01/22/2020    CALCIUM 9.3 04/01/2020    BILITOT <0.2 01/22/2020    ALKPHOS 61 01/22/2020    AST 40 01/22/2020    ALT 32 01/22/2020       POC Tests: No results for input(s): POCGLU, POCNA, POCK, POCCL, POCBUN, POCHEMO, POCHCT in the last 72 hours. Coags: No results found for: PROTIME, INR, APTT    HCG (If Applicable): No results found for: PREGTESTUR, PREGSERUM, HCG, HCGQUANT     ABGs: No results found for: PHART, PO2ART, YMI3EKI, UYB7URC, BEART, P5QJJKKO     Type & Screen (If Applicable):  No results found for: LABABO, LABRH    Drug/Infectious Status (If Applicable):  No results found for: HIV, HEPCAB    COVID-19 Screening (If Applicable):   Lab Results   Component Value Date    COVID19 Not Detected 07/25/2020         Anesthesia Evaluation  Patient summary reviewed and Nursing notes reviewed no history of anesthetic complications:   Airway: Mallampati: II        Dental: normal exam     Comment: boken left lower wisdom tooth .  Remaining tooth intact and stable    Pulmonary:

## 2020-07-29 NOTE — BRIEF OP NOTE
Brief Postoperative Note      Patient: Floridalma Mcdonald  YOB: 1975  MRN: [id number]    Date of Procedure: 7/29/2020    Pre-Op Diagnosis: CHEST PAIN OF UNERTAIN ETIOLOG, PERIUMBILICAL PAIN, GASTROESOPHAGEAL REFLUX DISEASE, NSAID LONG TERM USE, change in bowel habit and diarrhea       Post-Op Diagnosis: GERD, hiatal hernia and polyps        Procedure(s):  COLONOSCOPY POLYPECTOMY SNARE/COLD BIOPSY  EGD BIOPSY    Surgeon(s):  Mane Ashby MD    Assistant:  * No surgical staff found *    Anesthesia: Monitor Anesthesia Care    Estimated Blood Loss (mL): none    Complications: None    Specimens:   ID Type Source Tests Collected by Time Destination   A : DUODENUM/HX OF DIARRHEA Tissue Duodenum SURGICAL PATHOLOGY Mane Ashby MD 7/29/2020 2972    B : RANDOM COLON/HX OF DIARRHEA Tissue Colon SURGICAL PATHOLOGY Mane Ashby MD 7/29/2020 0845    C : rectal polyp Tissue Recto sigmoid SURGICAL PATHOLOGY Mane Ashby MD 7/29/2020 0798        Implants:  * No implants in log *      Drains: * No LDAs found *    Findings:  GERD, hiatal hernia and polyps    Electronically signed by Mane Ashby MD on 7/29/2020 at 8:57 AM

## 2020-07-30 NOTE — OP NOTE
[address] [place] [address]                                OPERATIVE REPORT    PATIENT NAME: Carmen Barrera                  :        1975  MED REC NO:   [id number]                           ROOM:  ACCOUNT NO:   [de-identified]                           ADMIT DATE: 2020  PROVIDER:     Ileana Lewis M.D.    DATE OF PROCEDURE:  2020    INDICATIONS:  The patient with history of gastric reflux; change in  bowel habits; intermittent diarrhea; chest discomfort, possibly  noncardiac. Plan today for EGD and colonoscopy to evaluate. ASA CLASSIFICATION:  Please see Anesthesia note. SURGEON:  Ileana Lewis MD    ESTIMATED BLOOD LOSS:  None. DESCRIPTION OF PROCEDURE:  The patient was brought to the GI lab. Consent was obtained. Risks involved with the procedure were explained  to the patient. Informed consent was obtained. The patient was  monitored during the procedure with pulse oximetry, blood pressure  monitoring, and oxygen by nasal cannula. Sedation by incremental doses  of IV propofol given by the anesthesia service to achieve total IV  anesthesia. For ASA classification and medications given during the  procedure, please see Anesthesia note. PROCEDURE PERFORMED #1:  EGD with biopsy. A standard video 190 Olympus upper scope was advanced under direct  vision from the oral cavity up to the duodenum. Esophagus showed mild  duodenitis seen. Biopsy was obtained from the duodenum to evaluate due  to duodenitis as well as history of diarrhea. Scope withdrawn. IMPRESSION:  1. Acid reflux. 2.  Small hiatus hernia. 3.  Gastritis and duodenitis. 4.  Biopsy obtained from the duodenum due to history of diarrhea as well  as duodenitis. PLAN:  Follow up with biopsy results at the GI clinic for evaluation. More recommendations after reviewing the biopsy results.     PROCEDURE PERFORMED #2:  Colonoscopy with polypectomy using hot biopsy  forceps and biopsy. Digital examination revealed normal rectum. Standard colonoscope was  advanced under direct vision from the rectum up to the cecum. Prep was  good and the patient tolerated the procedure well. Cecum intubation was  confirmed by appendiceal orifice. Distal sigmoid polyp seen, excised  with hot biopsy forceps, tissue retrieved. During exam, random biopsy  in one jar due to history of diarrhea to evaluate. Scope withdrawn with  no immediate complication. IMPRESSION:  1. Very small polyps, removed using hot biopsy forceps. 2.  Biopsy obtained in one jar due to history of diarrhea. 3.  Very mild, nonclinically significant diverticulosis. PLAN:  Follow up with biopsy results at the GI clinic for evaluation. More recommendation after reviewing the biopsy results.         Randy Vela M.D.    D: 07/29/2020 9:16:03       T: 07/29/2020 9:51:43     [email or web address]  Job#: [id number]     Doc#: [id number]    CC:  Shari Brooke CNP

## 2020-11-09 ENCOUNTER — NURSE ONLY (OUTPATIENT)
Dept: LAB | Age: 45
End: 2020-11-09

## 2020-11-09 LAB
ALBUMIN SERPL-MCNC: 4.5 G/DL (ref 3.5–5.1)
ALP BLD-CCNC: 65 U/L (ref 38–126)
ALT SERPL-CCNC: 23 U/L (ref 11–66)
AMYLASE: 107 U/L (ref 20–104)
ANION GAP SERPL CALCULATED.3IONS-SCNC: 14 MEQ/L (ref 8–16)
AST SERPL-CCNC: 24 U/L (ref 5–40)
BASOPHILS # BLD: 0.3 %
BASOPHILS ABSOLUTE: 0 THOU/MM3 (ref 0–0.1)
BILIRUB SERPL-MCNC: 0.4 MG/DL (ref 0.3–1.2)
BUN BLDV-MCNC: 10 MG/DL (ref 7–22)
CALCIUM SERPL-MCNC: 9.9 MG/DL (ref 8.5–10.5)
CHLORIDE BLD-SCNC: 104 MEQ/L (ref 98–111)
CHOLESTEROL, TOTAL: 205 MG/DL (ref 100–199)
CO2: 26 MEQ/L (ref 23–33)
CREAT SERPL-MCNC: 0.8 MG/DL (ref 0.4–1.2)
EOSINOPHIL # BLD: 1.8 %
EOSINOPHILS ABSOLUTE: 0.1 THOU/MM3 (ref 0–0.4)
ERYTHROCYTE [DISTWIDTH] IN BLOOD BY AUTOMATED COUNT: 13.2 % (ref 11.5–14.5)
ERYTHROCYTE [DISTWIDTH] IN BLOOD BY AUTOMATED COUNT: 45.3 FL (ref 35–45)
FOLATE: > 20 NG/ML (ref 4.8–24.2)
GFR SERPL CREATININE-BSD FRML MDRD: > 90 ML/MIN/1.73M2
GLUCOSE BLD-MCNC: 92 MG/DL (ref 70–108)
HCT VFR BLD CALC: 48 % (ref 42–52)
HDLC SERPL-MCNC: 78 MG/DL
HEMOGLOBIN: 15.3 GM/DL (ref 14–18)
IMMATURE GRANS (ABS): 0.03 THOU/MM3 (ref 0–0.07)
IMMATURE GRANULOCYTES: 0.5 %
LDL CHOLESTEROL CALCULATED: 100 MG/DL
LIPASE: 18.3 U/L (ref 5.6–51.3)
LYMPHOCYTES # BLD: 35.3 %
LYMPHOCYTES ABSOLUTE: 2.2 THOU/MM3 (ref 1–4.8)
MAGNESIUM: 1.9 MG/DL (ref 1.6–2.4)
MCH RBC QN AUTO: 30 PG (ref 26–33)
MCHC RBC AUTO-ENTMCNC: 31.9 GM/DL (ref 32.2–35.5)
MCV RBC AUTO: 94.1 FL (ref 80–94)
MONOCYTES # BLD: 8.1 %
MONOCYTES ABSOLUTE: 0.5 THOU/MM3 (ref 0.4–1.3)
NUCLEATED RED BLOOD CELLS: 0 /100 WBC
PLATELET # BLD: 256 THOU/MM3 (ref 130–400)
PMV BLD AUTO: 10 FL (ref 9.4–12.4)
POTASSIUM SERPL-SCNC: 4.3 MEQ/L (ref 3.5–5.2)
PROSTATE SPECIFIC ANTIGEN: 0.57 NG/ML (ref 0–1)
RBC # BLD: 5.1 MILL/MM3 (ref 4.7–6.1)
SEG NEUTROPHILS: 54 %
SEGMENTED NEUTROPHILS ABSOLUTE COUNT: 3.3 THOU/MM3 (ref 1.8–7.7)
SODIUM BLD-SCNC: 144 MEQ/L (ref 135–145)
TOTAL PROTEIN: 7.2 G/DL (ref 6.1–8)
TRIGL SERPL-MCNC: 133 MG/DL (ref 0–199)
TSH SERPL DL<=0.05 MIU/L-ACNC: 0.72 UIU/ML (ref 0.4–4.2)
VITAMIN B-12: 370 PG/ML (ref 211–911)
VITAMIN D 25-HYDROXY: 25 NG/ML (ref 30–100)
WBC # BLD: 6.2 THOU/MM3 (ref 4.8–10.8)

## 2020-11-13 LAB — VITAMIN D2 AND D3, TOTAL: NORMAL

## 2020-11-15 LAB — VITAMIN B1 WHOLE BLOOD: 136 NMOL/L (ref 70–180)

## 2021-02-04 ENCOUNTER — OFFICE VISIT (OUTPATIENT)
Dept: CARDIOLOGY CLINIC | Age: 46
End: 2021-02-04
Payer: COMMERCIAL

## 2021-02-04 VITALS
WEIGHT: 175.6 LBS | HEART RATE: 72 BPM | HEIGHT: 73 IN | DIASTOLIC BLOOD PRESSURE: 92 MMHG | SYSTOLIC BLOOD PRESSURE: 134 MMHG | BODY MASS INDEX: 23.27 KG/M2

## 2021-02-04 DIAGNOSIS — Z87.898 HISTORY OF CHEST PAIN: Primary | ICD-10-CM

## 2021-02-04 PROCEDURE — 99212 OFFICE O/P EST SF 10 MIN: CPT | Performed by: INTERNAL MEDICINE

## 2021-02-04 NOTE — PROGRESS NOTES
organization: Not on file     Attends meetings of clubs or organizations: Not on file     Relationship status: Not on file    Intimate partner violence     Fear of current or ex partner: Not on file     Emotionally abused: Not on file     Physically abused: Not on file     Forced sexual activity: Not on file   Other Topics Concern    Not on file   Social History Narrative    Not on file       Current Outpatient Medications   Medication Sig Dispense Refill    omeprazole (PRILOSEC) 20 MG delayed release capsule 1 capsule      ibuprofen (IBU) 400 MG tablet Take 1 tablet by mouth every 6 hours as needed for Pain 20 tablet 0    therapeutic multivitamin-minerals (THERAGRAN-M) tablet Take 1 tablet by mouth daily. No current facility-administered medications for this visit. Review of Systems -     General ROS: negative  Psychological ROS: negative  Hematological and Lymphatic ROS: No history of blood clots or bleeding disorder. Respiratory ROS: no cough,  or wheezing, the rest see HPI  Cardiovascular ROS: See HPI  Gastrointestinal ROS: negative  Genito-Urinary ROS: no dysuria, trouble voiding, or hematuria  Musculoskeletal ROS: negative  Neurological ROS: no TIA or stroke symptoms  Dermatological ROS: negative      Blood pressure (!) 134/92, pulse 72, height 6' 1\" (1.854 m), weight 175 lb 9.6 oz (79.7 kg).         Physical Examination:    General appearance - alert, well appearing, and in no distress  HEENT- Pink conjunctiva  , Non-icteri sclera,PERRLA  Mental status - alert, oriented to person, place, and time  Neck - supple, no significant adenopathy, no JVD, or carotid bruits  Chest - clear to auscultation, no wheezes, rales or rhonchi, symmetric air entry  Heart - normal rate, regular rhythm, normal S1, S2, no murmurs, rubs, clicks or gallops  Abdomen - soft, nontender, nondistended, no masses or organomegaly  CONCHITA- no CVA or flank tenderness, no suprapubic tenderness  Neurological - alert, oriented, normal speech, no focal findings or movement disorder noted  Musculoskeletal/limbs - no joint tenderness, deformity or swelling   - peripheral pulses normal, no pedal edema, no clubbing or cyanosis  Skin - normal coloration and turgor, no rashes, no suspicious skin lesions noted  Psych- appropriate mood and affect    Lab  No results for input(s): CKTOTAL, CKMB, CKMBINDEX, TROPONINI in the last 72 hours.   CBC:   Lab Results   Component Value Date    WBC 6.2 11/09/2020    RBC 5.10 11/09/2020    HGB 15.3 11/09/2020    HCT 48.0 11/09/2020    MCV 94.1 11/09/2020    MCH 30.0 11/09/2020    MCHC 31.9 11/09/2020    RDW 13.6 09/27/2013     11/09/2020    MPV 10.0 11/09/2020     BMP:    Lab Results   Component Value Date     11/09/2020    K 4.3 11/09/2020    K 4.4 04/01/2020     11/09/2020    CO2 26 11/09/2020    BUN 10 11/09/2020    LABALBU 4.5 11/09/2020    CREATININE 0.8 11/09/2020    CALCIUM 9.9 11/09/2020    LABGLOM >90 11/09/2020    GLUCOSE 92 11/09/2020     Hepatic Function Panel:    Lab Results   Component Value Date    ALKPHOS 65 11/09/2020    ALT 23 11/09/2020    AST 24 11/09/2020    PROT 7.2 11/09/2020    BILITOT 0.4 11/09/2020    BILIDIR 0.2 09/27/2013    LABALBU 4.5 11/09/2020     Magnesium:    Lab Results   Component Value Date    MG 1.9 11/09/2020     Warfarin PT/INR:  No components found for: PTPATWAR, PTINRWAR  HgBA1c:    Lab Results   Component Value Date    LABA1C 5.3 01/18/2020     FLP:    Lab Results   Component Value Date    TRIG 133 11/09/2020    HDL 78 11/09/2020    LDLCALC 100 11/09/2020     TSH:    Lab Results   Component Value Date    TSH 0.720 11/09/2020          Peak HR:174 bpm                      HR response: Appropriate    Peak BP:200/100 mmHg                 BP response: Normal Resting BP with    Predicted HR: 177 bpm                appropriate response to Stress    % of predicted HR: 98                HR/BP product:24384    Test duration:11 min                 Max exercise: 12.1 METS    Reason for termination:Target HR    achieved                             Exercise effort:Good             Imaging Results:Calculated gated LVEF 49 %. The T.I.D. ratio was 1.04 . There is moderate attenuation artifact noted in the inferior wall seems to   be related to bowel artifact. There was no evidence of definite reversible tracer uptake. The nuclear images is not suggestive for myocardial ischemia.        Conclusions        Summary    Exercise EKG stress test is not suggestive for ischemia.    This Nuclear Medicine study was negative for ischemia .        Signatures        ----------------------------------------------------------------    Electronically signed by Loraine Rogers MD (Interpreting    Cardiologist) on 01/18/2020 at 14:34    ----------------------------------------------------------------         Ekg4/6/2020  NSR, NO acute abn       Conclusions      Summary   Normal left ventricle size and systolic function. Ejection fraction was   estimated at 55 %. There were no regional left ventricular wall motion   abnormalities and wall thickness was within normal limits. Signature      ----------------------------------------------------------------   Electronically signed by Loraine Rogers MD (Interpreting   physician) on 01/18/2020 at 06:20 PM    Normal sinus rhythm  Normal ECG  When compared with ECG of 24-MAR-2020 10:30,  No significant change was found  Confirmed by NIGEL CHARLES ([id number]) on 4/2/2020 8:03:39 AM  Assessment       Diagnosis Orders   1.  History of chest pain Atypical           Plan     The current meds and labs reviewed    ekg no abnromality  Troponin Negative    Hx of  chest pain is noncardiac  In nature- better  It pleuritic  And cp free since 04/01/2020  Has been recently treated  And DX for Flu 03/24/2020  No more cp for over 9 months    Ex nuc stress negative    Extensive discussion  Has been done with the patient    Genora Age to have EGD    D/w the pat the plan of care    Taking ibuprofen for back pain once in a while      Consider Coronary CTA if any further recurrence    Doing well  And stable    RTC in  PRN    Formerly [facility]

## 2021-02-04 NOTE — LETTER
[address] [facility]  [place] [address]  [facility] [address]  Phone: [phone]  Fax: [phone]    Dion Will MD        February 4, 2021    [address]      To Whom It May Concern:    Derick Mccormick was seen in our office today. If you have any questions or concerns, please don't hesitate to call.     Sincerely,        Dion Will MD

## 2021-03-02 ENCOUNTER — HOSPITAL ENCOUNTER (EMERGENCY)
Age: 46
Discharge: HOME | End: 2021-03-02
Payer: COMMERCIAL

## 2021-03-02 ENCOUNTER — APPOINTMENT (OUTPATIENT)
Dept: GENERAL RADIOLOGY | Age: 46
End: 2021-03-02
Payer: COMMERCIAL

## 2021-03-02 VITALS
DIASTOLIC BLOOD PRESSURE: 88 MMHG | TEMPERATURE: 98.3 F | OXYGEN SATURATION: 100 % | HEART RATE: 83 BPM | SYSTOLIC BLOOD PRESSURE: 149 MMHG | RESPIRATION RATE: 18 BRPM

## 2021-03-02 DIAGNOSIS — S99.921A RIGHT FOOT INJURY, INITIAL ENCOUNTER: Primary | ICD-10-CM

## 2021-03-02 PROCEDURE — 99284 EMERGENCY DEPT VISIT MOD MDM: CPT

## 2021-03-02 PROCEDURE — 6370000000 HC RX 637 (ALT 250 FOR IP): Performed by: PHYSICIAN ASSISTANT

## 2021-03-02 PROCEDURE — 73630 X-RAY EXAM OF FOOT: CPT

## 2021-03-02 RX ORDER — HYDROCODONE BITARTRATE AND ACETAMINOPHEN 5; 325 MG/1; MG/1
1 TABLET ORAL ONCE
Status: COMPLETED | OUTPATIENT
Start: 2021-03-02 | End: 2021-03-02

## 2021-03-02 RX ORDER — NAPROXEN 500 MG/1
500 TABLET ORAL 2 TIMES DAILY
Qty: 60 TABLET | Refills: 0 | Status: SHIPPED | OUTPATIENT
Start: 2021-03-02 | End: 2022-07-29

## 2021-03-02 RX ORDER — HYDROCODONE BITARTRATE AND ACETAMINOPHEN 5; 325 MG/1; MG/1
1 TABLET ORAL EVERY 6 HOURS PRN
Qty: 10 TABLET | Refills: 0 | Status: SHIPPED | OUTPATIENT
Start: 2021-03-02 | End: 2021-03-05

## 2021-03-02 RX ADMIN — HYDROCODONE BITARTRATE AND ACETAMINOPHEN 1 TABLET: 5; 325 TABLET ORAL at 19:03

## 2021-03-02 ASSESSMENT — ENCOUNTER SYMPTOMS
COUGH: 0
VOMITING: 0
NAUSEA: 0
ABDOMINAL PAIN: 0
PHOTOPHOBIA: 0
SHORTNESS OF BREATH: 0
CHEST TIGHTNESS: 0
COLOR CHANGE: 1
FACIAL SWELLING: 0

## 2021-03-02 ASSESSMENT — PAIN SCALES - GENERAL: PAINLEVEL_OUTOF10: 8

## 2021-03-02 ASSESSMENT — PAIN DESCRIPTION - ORIENTATION: ORIENTATION: RIGHT

## 2021-03-02 ASSESSMENT — PAIN DESCRIPTION - LOCATION: LOCATION: FOOT

## 2021-03-02 NOTE — ED PROVIDER NOTES
[facility]  eMERGENCY dEPARTMENT eNCOUnter          [address] [facility]       Chief Complaint   Patient presents with    Foot Pain     right       Nurses Notes reviewed and I agree except as noted inthe HPI. HISTORY OF PRESENT ILLNESS    Waynetta Ahumada is a 42 y.o. male who presents to the Emergency Department for the evaluation of right foot pain since last night. Patient states he was drunk last night and sister witnessed him fall down the stairs. He believes he landed on his foot wrong and that is what is causing foot pain. He denies loss of consciousness or head trauma and is not anticoagulated. He has not tried anything for the pain in his foot. He is able to bear weight and currently rates pain about a 6/10, worse with ambulation. Pain primarily in all 5 digits of the right foot. He denies pain in right ankle, knee, hip or diffuse left leg. Denies weakness or paresthesias in right leg and foot. The HPI was provided by the patient. REVIEW OF SYSTEMS     Review of Systems   Constitutional: Negative for fatigue and fever. HENT: Negative for facial swelling. Eyes: Negative for photophobia and visual disturbance. Respiratory: Negative for cough, chest tightness and shortness of breath. Cardiovascular: Negative for chest pain and palpitations. Gastrointestinal: Negative for abdominal pain, nausea and vomiting. Endocrine: Negative for polyuria. Genitourinary: Negative for dysuria. Musculoskeletal: Positive for arthralgias (Right foot) and myalgias (Right foot). Skin: Positive for color change (Erythema on dorsum of right foot). Neurological: Negative for dizziness, syncope, weakness, light-headedness and headaches. Psychiatric/Behavioral: Negative for agitation and confusion. PAST MEDICAL HISTORY    has a past medical history of Heartburn and Unintentional weight loss.     SURGICAL HISTORY      has a past surgical history that includes Colonoscopy (Left, 2020) and Upper gastrointestinal endoscopy (Left, 2020). CURRENT MEDICATIONS       Discharge Medication List as of 3/2/2021  7:40 PM      CONTINUE these medications which have NOT CHANGED    Details   omeprazole (PRILOSEC) 20 MG delayed release capsule 1 capsuleHistorical Med      ibuprofen (IBU) 400 MG tablet Take 1 tablet by mouth every 6 hours as needed for Pain, Disp-20 tablet, R-0Print      therapeutic multivitamin-minerals (THERAGRAN-M) tablet Take 1 tablet by mouth daily. ALLERGIES     has No Known Allergies. FAMILY HISTORY     He indicated that his mother is . He indicated that his father is . family history includes Colon Cancer in his mother; Prostate Cancer in his father. SOCIAL HISTORY      reports that he has been smoking cigarettes. He has been smoking about 1.00 pack per day. He has never used smokeless tobacco. He reports current alcohol use. He reports previous drug use. Drug: Marijuana. PHYSICAL EXAM     INITIAL VITALS:  oral temperature is 98.3 °F (36.8 °C). His blood pressure is 149/88 (abnormal) and his pulse is 83. His respiration is 18 and oxygen saturation is 100%. Physical Exam  Constitutional:       Appearance: Normal appearance. HENT:      Head: Normocephalic and atraumatic. Right Ear: External ear normal.      Left Ear: External ear normal.      Nose: Nose normal.      Mouth/Throat:      Mouth: Mucous membranes are moist.      Pharynx: Oropharynx is clear. Eyes:      Extraocular Movements: Extraocular movements intact. Conjunctiva/sclera: Conjunctivae normal.      Pupils: Pupils are equal, round, and reactive to light. Neck:      Musculoskeletal: Normal range of motion and neck supple. Cardiovascular:      Rate and Rhythm: Normal rate and regular rhythm. Pulses: Normal pulses. Dorsalis pedis pulses are 2+ on the right side and 2+ on the left side. Heart sounds: Normal heart sounds.    Pulmonary: Effort: Pulmonary effort is normal.      Breath sounds: Normal breath sounds. Musculoskeletal:         General: Swelling, tenderness and signs of injury present. Right ankle: He exhibits decreased range of motion and swelling. Right foot: Decreased range of motion. No deformity. Left foot: Normal range of motion. Feet:    Feet:      Right foot:      Skin integrity: Erythema and warmth present. Left foot:      Skin integrity: Skin integrity normal.   Skin:     General: Skin is warm and dry. Capillary Refill: Capillary refill takes less than 2 seconds. Findings: Erythema (Dorsum of right foot) present. Neurological:      General: No focal deficit present. Mental Status: He is alert and oriented to person, place, and time. Psychiatric:         Mood and Affect: Mood normal.         DIFFERENTIAL DIAGNOSIS:   Differential diagnoses are discussed    DIAGNOSTIC RESULTS     EKG: All EKG's are interpreted by the Emergency Department Physician who either signs or Co-signsthis chart in the absence of a cardiologist.        RADIOLOGY: non-plain film images(s) such as CT, Ultrasound and MRI are read by the radiologist.    XR FOOT RIGHT (MIN 3 VIEWS)   Final Result   1. No acute fracture or malalignment. **This report has been created using voice recognition software. It may contain minor errors which are inherent in voice recognition technology. **      Final report electronically signed by Dr. Karthik Pacheco on 3/2/2021 5:08 PM          LABS:    Labs Reviewed - No data to display    EMERGENCY DEPARTMENT COURSE:   Vitals:    Vitals:    03/02/21 1621   BP: (!) 149/88   Pulse: 83   Resp: 18   Temp: 98.3 °F (36.8 °C)   TempSrc: Oral   SpO2: 100%      6:40 PM EST: The patient was seen and evaluated. Patient presents for complaints of right foot pain after falling down a couple of stairs yesterday evening.   He is neurovascularly intact but does have significantly decreased range of motion with attempt at flexing the great digit of the right foot, which is also the source of greatest pain. He has small superficial abrasion to the dorsal, medial aspect of this digit without any penetrating wound. Discussed with podiatry service who were notified of case presentation and reviewed x-ray images. They feel comfortable with making the patient nonweightbearing at this time, short course of pain control, follow-up in the office. This was discussed with the patient who is agreeable as well. Return precautions were discussed and he denied further needs at this time. CRITICAL CARE:   None    CONSULTS:  Podiatry    PROCEDURES:  None    FINAL IMPRESSION      1. Right foot injury, initial encounter          DISPOSITION/PLAN   Discharge    PATIENT REFERRED TO:  Christiana Maguire, [address]  [phone]    Call in 1 day      [address] [address]  [phone]    If symptoms worsen      DISCHARGEMEDICATIONS:  Discharge Medication List as of 3/2/2021  7:40 PM      START taking these medications    Details   naproxen (NAPROSYN) 500 MG tablet Take 1 tablet by mouth 2 times daily Do not take with ibuprofen, advil, motrin, or aleve., Disp-60 tablet, R-0Print      HYDROcodone-acetaminophen (NORCO) 5-325 MG per tablet Take 1 tablet by mouth every 6 hours as needed for Pain for up to 3 days. WARNING: This medication may make you drowsy.  Do not operate heavy machinery or motor vehicles during use., Disp-10 tablet, R-0Print             (Please note that portions of this note were completedwith a voice recognition program.  Efforts were made to edit the dictations but occasionally words are mis-transcribed.)      Day Rehman PA-C  03/05/21 4140

## 2021-12-20 ENCOUNTER — HOSPITAL ENCOUNTER (EMERGENCY)
Age: 46
Discharge: HOME | End: 2021-12-20
Payer: COMMERCIAL

## 2021-12-20 VITALS
OXYGEN SATURATION: 99 % | WEIGHT: 160 LBS | TEMPERATURE: 99.1 F | BODY MASS INDEX: 20.53 KG/M2 | SYSTOLIC BLOOD PRESSURE: 135 MMHG | HEART RATE: 108 BPM | HEIGHT: 74 IN | DIASTOLIC BLOOD PRESSURE: 83 MMHG | RESPIRATION RATE: 16 BRPM

## 2021-12-20 DIAGNOSIS — J11.1 INFLUENZA WITH RESPIRATORY MANIFESTATION OTHER THAN PNEUMONIA: Primary | ICD-10-CM

## 2021-12-20 DIAGNOSIS — J02.9 ACUTE PHARYNGITIS, UNSPECIFIED ETIOLOGY: ICD-10-CM

## 2021-12-20 LAB
FLU A ANTIGEN: NEGATIVE
FLU B ANTIGEN: POSITIVE
GROUP A STREP CULTURE, REFLEX: NEGATIVE
REFLEX THROAT C + S: NORMAL
SARS-COV-2, NAAT: NOT  DETECTED

## 2021-12-20 PROCEDURE — 6370000000 HC RX 637 (ALT 250 FOR IP): Performed by: NURSE PRACTITIONER

## 2021-12-20 PROCEDURE — 87077 CULTURE AEROBIC IDENTIFY: CPT

## 2021-12-20 PROCEDURE — 87880 STREP A ASSAY W/OPTIC: CPT

## 2021-12-20 PROCEDURE — 87804 INFLUENZA ASSAY W/OPTIC: CPT

## 2021-12-20 PROCEDURE — 99283 EMERGENCY DEPT VISIT LOW MDM: CPT

## 2021-12-20 PROCEDURE — 87070 CULTURE OTHR SPECIMN AEROBIC: CPT

## 2021-12-20 PROCEDURE — 87635 SARS-COV-2 COVID-19 AMP PRB: CPT

## 2021-12-20 RX ORDER — ONDANSETRON 4 MG/1
4 TABLET, ORALLY DISINTEGRATING ORAL ONCE
Status: COMPLETED | OUTPATIENT
Start: 2021-12-20 | End: 2021-12-20

## 2021-12-20 RX ORDER — IBUPROFEN 800 MG/1
800 TABLET ORAL ONCE
Status: COMPLETED | OUTPATIENT
Start: 2021-12-20 | End: 2021-12-20

## 2021-12-20 RX ORDER — ONDANSETRON 4 MG/1
4 TABLET, ORALLY DISINTEGRATING ORAL EVERY 8 HOURS PRN
Qty: 20 TABLET | Refills: 0 | Status: SHIPPED | OUTPATIENT
Start: 2021-12-20 | End: 2022-08-22

## 2021-12-20 RX ADMIN — ONDANSETRON 4 MG: 4 TABLET, ORALLY DISINTEGRATING ORAL at 17:20

## 2021-12-20 RX ADMIN — IBUPROFEN 800 MG: 800 TABLET, FILM COATED ORAL at 17:20

## 2021-12-20 ASSESSMENT — PAIN DESCRIPTION - DESCRIPTORS: DESCRIPTORS: HEADACHE

## 2021-12-20 ASSESSMENT — PAIN SCALES - GENERAL: PAINLEVEL_OUTOF10: 10

## 2021-12-20 ASSESSMENT — PAIN DESCRIPTION - LOCATION: LOCATION: HEAD

## 2021-12-20 ASSESSMENT — PAIN DESCRIPTION - PAIN TYPE: TYPE: ACUTE PAIN

## 2021-12-20 NOTE — Clinical Note
Yoel Abebe was seen and treated in our emergency department on 12/20/2021. He may return to work on 12/23/2021. If you have any questions or concerns, please don't hesitate to call.       Lilly Ya, APRN - CNP

## 2021-12-20 NOTE — ED PROVIDER NOTES
[address] [facility] [address] [address] EMERGENCY DEPT  EMERGENCY MEDICINE     Pt Name: Otto Keller  MRN: [id number]  [place] 1975  Date of evaluation: 12/20/2021  PCP:    NORMA Ring CNP  Provider: NORMA Proctor CNP    CHIEF COMPLAINT       Chief Complaint   Patient presents with    Headache    Pharyngitis    Fever       Location/Symptom: Headache and sore throat  Timing/Onset: yesterday around 5 PM  Quality: Ache  Duration: Constant  Modifying Factors: Throat pain worse with swallowing. Severity: 10 out of 10    HISTORY OF PRESENT ILLNESS    Eleno Wilder is a 22-year-old male patient that presents to ER with complaint of headache, sore throat, nausea and cough. Patient denies chest pain, shortness of breath, vomiting or diarrhea. States his symptoms started yesterday at 5 PM with the exception of nausea which started upon arrival here. Patient has no significant past medical history that is documented. Triage notes and Nursing notes were reviewed by myself. Any discrepancies are addressed above. PAST MEDICAL HISTORY     Past Medical History:   Diagnosis Date    Heartburn     Unintentional weight loss        SURGICAL HISTORY       Past Surgical History:   Procedure Laterality Date    COLONOSCOPY Left 7/29/2020    COLONOSCOPY POLYPECTOMY SNARE/COLD BIOPSY performed by Leslie Granger MD at [address] [facility]   [address] [place] [address] Drive Left 7/29/2020    EGD BIOPSY performed by Leslie Granger MD at [address] [facility]       CURRENT MEDICATIONS       Previous Medications    IBUPROFEN (IBU) 400 MG TABLET    Take 1 tablet by mouth every 6 hours as needed for Pain    NAPROXEN (NAPROSYN) 500 MG TABLET    Take 1 tablet by mouth 2 times daily Do not take with ibuprofen, advil, motrin, or aleve. OMEPRAZOLE (PRILOSEC) 20 MG DELAYED RELEASE CAPSULE    1 capsule    THERAPEUTIC MULTIVITAMIN-MINERALS (THERAGRAN-M) TABLET    Take 1 tablet by mouth daily.          ALLERGIES     No Known Allergies    FAMILY Last Year: Not on file    Number of Places Lived in the Last Year: Not on file    Unstable Housing in the Last Year: Not on file       REVIEW OF SYSTEMS     Review of Systems   Constitutional: Positive for fatigue and fever. Negative for appetite change, chills and unexpected weight change. HENT: Positive for rhinorrhea. Negative for congestion, ear pain, sinus pressure and sinus pain. Eyes: Negative for pain and visual disturbance. Respiratory: Positive for cough. Negative for shortness of breath and wheezing. Cardiovascular: Negative for chest pain, palpitations and leg swelling. Gastrointestinal: Negative for abdominal pain, blood in stool, constipation, diarrhea, nausea and vomiting. Genitourinary: Negative for dysuria, frequency and hematuria. Musculoskeletal: Negative for arthralgias and joint swelling. Skin: Negative for rash. Neurological: Positive for headaches. Negative for dizziness, syncope, weakness and light-headedness. Hematological: Does not bruise/bleed easily. Except as noted above the remainder of the review of systems was reviewed and is negative. SCREENINGS                        PHYSICAL EXAM    (up to 7 for level 4, 8 or more for level 5)     ED Triage Vitals   BP Temp Temp src Pulse Resp SpO2 Height Weight   -- -- -- -- -- -- -- --       Physical Exam  Vitals and nursing note reviewed. Constitutional:       General: He is not in acute distress. Appearance: He is well-developed. He is not diaphoretic. HENT:      Head: Normocephalic and atraumatic. Right Ear: Tympanic membrane and ear canal normal. No drainage, swelling or tenderness. No middle ear effusion. Tympanic membrane is not erythematous. Left Ear: Tympanic membrane and ear canal normal. No drainage, swelling or tenderness. No middle ear effusion. Tympanic membrane is not erythematous. Nose: Rhinorrhea present. No congestion.       Mouth/Throat:      Mouth: Mucous membranes are moist. No oral lesions. Pharynx: Posterior oropharyngeal erythema present. No pharyngeal swelling, oropharyngeal exudate or uvula swelling. Eyes:      Conjunctiva/sclera: Conjunctivae normal.      Pupils: Pupils are equal, round, and reactive to light. Cardiovascular:      Rate and Rhythm: Normal rate and regular rhythm. Heart sounds: Normal heart sounds. No murmur heard. No gallop. Pulmonary:      Effort: Pulmonary effort is normal. No respiratory distress. Breath sounds: Normal breath sounds. No wheezing or rales. Abdominal:      General: Bowel sounds are normal.      Palpations: Abdomen is soft. Musculoskeletal:         General: Normal range of motion. Cervical back: Normal range of motion. Skin:     General: Skin is warm and dry. Neurological:      General: No focal deficit present. Mental Status: He is alert and oriented to person, place, and time. DIAGNOSTIC RESULTS     EKG:(none if blank)  All EKGs are interpreted by the Emergency Department Physician who either signs or Co-signs this chart in the absence of a cardiologist.      RADIOLOGY: (none if blank)   I directly visualized the following images and reviewed the radiologist interpretations. Interpretation per the Radiologist below, if available at the time of this note:  No orders to display       LABS:  Labs Reviewed   RAPID INFLUENZA A/B ANTIGENS - Abnormal; Notable for the following components:       Result Value    Flu B Antigen Positive (*)     All other components within normal limits   COVID-19, RAPID   CULTURE, THROAT    Narrative:     Source: Specimen not received       Site:           Current Antibiotics:   GROUP A STREP, REFLEX       All other labs were within normal range or not returned as of this dictation. Please note, any cultures that may have been sent were not resulted at the time of this patient visit.     EMERGENCY DEPARTMENT COURSE and Medical Decision Making:     Vitals:    Vitals: 12/20/21 1638   BP: 135/83   Pulse: 108   Resp: 16   Temp: 99.1 °F (37.3 °C)   TempSrc: Oral   SpO2: 99%   Weight: 160 lb (72.6 kg)   Height: 6' 2\" (1.88 m)       PROCEDURES: (None if blank)  Procedures       MDM  Number of Diagnoses or Management Options  Acute pharyngitis, unspecified etiology: new, needed workup  Influenza with respiratory manifestation other than pneumonia: new, needed workup     Amount and/or Complexity of Data Reviewed  Clinical lab tests: ordered and reviewed    Risk of Complications, Morbidity, and/or Mortality  Presenting problems: minimal  Diagnostic procedures: low  Management options: low    Patient Progress  Patient progress: improved    Patient presents to ER with sudden onset of body aches, sore throat, fatigue, runny nose and nausea. Symptoms started yesterday. COVID-19 and rapid strep tests were negative. Influenza is positive for influenza B. Will treat with antivirals and provide patient with antiemetic. Patient follow-up with primary care provider. Strict return precautions and follow up instructions were discussed with the patient with which the patient agrees    ED Medications administered this visit:    Medications   ondansetron (ZOFRAN-ODT) disintegrating tablet 4 mg (4 mg Oral Given 12/20/21 1720)   ibuprofen (ADVIL;MOTRIN) tablet 800 mg (800 mg Oral Given 12/20/21 1720)         FINAL IMPRESSION      1. Influenza with respiratory manifestation other than pneumonia    2.  Acute pharyngitis, unspecified etiology          DISPOSITION/PLAN   DISPOSITION        PATIENT REFERRED TO:  NORMA Syed - CNP  [address] [facility] [address] [phone]            DISCHARGE MEDICATIONS:  New Prescriptions    BALOXAVIR MARBOXIL (XOFLUZA) 2 X 20 MG TABLET    Take 2 tablets by mouth once for 1 dose    ONDANSETRON (ZOFRAN ODT) 4 MG DISINTEGRATING TABLET    Take 1 tablet by mouth every 8 hours as needed for Nausea              NORMA Shah CNP (electronically signed)           Tiffanie Thompson, NORMA - CNP  12/20/21 hospitals[facility]ennifer Jack, APRN - CNP  12/22/21 2563

## 2021-12-20 NOTE — ED TRIAGE NOTES
Patient to ED from home thru intake c/c headache, fever, and sore throat. Patient states the symptoms started between 5902-2540 yesterday. States his headache 10/10 on the pain scale.

## 2021-12-22 LAB
ORGANISM: ABNORMAL
THROAT/NOSE CULTURE: ABNORMAL

## 2021-12-22 ASSESSMENT — ENCOUNTER SYMPTOMS
SINUS PAIN: 0
NAUSEA: 0
SINUS PRESSURE: 0
COUGH: 1
ABDOMINAL PAIN: 0
VOMITING: 0
EYE PAIN: 0
RHINORRHEA: 1
BLOOD IN STOOL: 0
WHEEZING: 0
SHORTNESS OF BREATH: 0
CONSTIPATION: 0
DIARRHEA: 0

## 2021-12-24 NOTE — PROGRESS NOTES
Pharmacy Note  ED Culture Follow-up    Geovanny Stearns is a 42 y.o. male. Allergies: Patient has no known allergies. Labs:  Lab Results   Component Value Date    BUN 10 11/09/2020    CREATININE 0.8 11/09/2020    WBC 6.2 11/09/2020     CrCl cannot be calculated (Patient's most recent lab result is older than the maximum 10 days allowed. ). Current antimicrobials:   None    ASSESSMENT:  Micro results:   Throat culture: positive for Haemophilus influenzae   Influenza B +     PLAN:  Need for intervention: No 2/2 likely normal throat abbey  Discussed with: Gene Vo MD  Chosen treatment:    No treatment indicated    Patient response:   No need to contact patient    Called/sent in prescription to: Not applicable    Please call with any questions.  Allen Kennedy [facility] [place], PharmD 8:07 PM 12/23/2021

## 2022-06-30 ENCOUNTER — APPOINTMENT (OUTPATIENT)
Dept: CT IMAGING | Age: 47
End: 2022-06-30
Payer: COMMERCIAL

## 2022-06-30 ENCOUNTER — HOSPITAL ENCOUNTER (EMERGENCY)
Age: 47
Discharge: HOME | End: 2022-07-01
Attending: STUDENT IN AN ORGANIZED HEALTH CARE EDUCATION/TRAINING PROGRAM
Payer: COMMERCIAL

## 2022-06-30 ENCOUNTER — APPOINTMENT (OUTPATIENT)
Dept: GENERAL RADIOLOGY | Age: 47
End: 2022-06-30
Payer: COMMERCIAL

## 2022-06-30 DIAGNOSIS — R10.12 LEFT UPPER QUADRANT ABDOMINAL PAIN: Primary | ICD-10-CM

## 2022-06-30 LAB
ALBUMIN SERPL-MCNC: 5 G/DL (ref 3.5–5.1)
ALP BLD-CCNC: 50 U/L (ref 38–126)
ALT SERPL-CCNC: 36 U/L (ref 11–66)
ANION GAP SERPL CALCULATED.3IONS-SCNC: 17 MEQ/L (ref 8–16)
AST SERPL-CCNC: 37 U/L (ref 5–40)
BASOPHILS # BLD: 0.3 %
BASOPHILS ABSOLUTE: 0 THOU/MM3 (ref 0–0.1)
BILIRUB SERPL-MCNC: 0.6 MG/DL (ref 0.3–1.2)
BILIRUBIN DIRECT: < 0.2 MG/DL (ref 0–0.3)
BUN BLDV-MCNC: 11 MG/DL (ref 7–22)
CALCIUM SERPL-MCNC: 10 MG/DL (ref 8.5–10.5)
CHLORIDE BLD-SCNC: 101 MEQ/L (ref 98–111)
CO2: 22 MEQ/L (ref 23–33)
CREAT SERPL-MCNC: 0.8 MG/DL (ref 0.4–1.2)
EOSINOPHIL # BLD: 0.4 %
EOSINOPHILS ABSOLUTE: 0 THOU/MM3 (ref 0–0.4)
ERYTHROCYTE [DISTWIDTH] IN BLOOD BY AUTOMATED COUNT: 13.3 % (ref 11.5–14.5)
ERYTHROCYTE [DISTWIDTH] IN BLOOD BY AUTOMATED COUNT: 43.3 FL (ref 35–45)
GFR SERPL CREATININE-BSD FRML MDRD: > 90 ML/MIN/1.73M2
GLUCOSE BLD-MCNC: 82 MG/DL (ref 70–108)
HCT VFR BLD CALC: 39.4 % (ref 42–52)
HEMOGLOBIN: 13.3 GM/DL (ref 14–18)
IMMATURE GRANS (ABS): 0.02 THOU/MM3 (ref 0–0.07)
IMMATURE GRANULOCYTES: 0.3 %
LIPASE: 25.2 U/L (ref 5.6–51.3)
LYMPHOCYTES # BLD: 29.8 %
LYMPHOCYTES ABSOLUTE: 2 THOU/MM3 (ref 1–4.8)
MCH RBC QN AUTO: 30 PG (ref 26–33)
MCHC RBC AUTO-ENTMCNC: 33.8 GM/DL (ref 32.2–35.5)
MCV RBC AUTO: 88.7 FL (ref 80–94)
MONOCYTES # BLD: 9.1 %
MONOCYTES ABSOLUTE: 0.6 THOU/MM3 (ref 0.4–1.3)
NUCLEATED RED BLOOD CELLS: 0 /100 WBC
PLATELET # BLD: 283 THOU/MM3 (ref 130–400)
PMV BLD AUTO: 9.7 FL (ref 9.4–12.4)
POTASSIUM REFLEX MAGNESIUM: 3.9 MEQ/L (ref 3.5–5.2)
RBC # BLD: 4.44 MILL/MM3 (ref 4.7–6.1)
SEG NEUTROPHILS: 60.1 %
SEGMENTED NEUTROPHILS ABSOLUTE COUNT: 4 THOU/MM3 (ref 1.8–7.7)
SODIUM BLD-SCNC: 140 MEQ/L (ref 135–145)
TOTAL PROTEIN: 7.5 G/DL (ref 6.1–8)
WBC # BLD: 6.7 THOU/MM3 (ref 4.8–10.8)

## 2022-06-30 PROCEDURE — A4216 STERILE WATER/SALINE, 10 ML: HCPCS

## 2022-06-30 PROCEDURE — 80076 HEPATIC FUNCTION PANEL: CPT

## 2022-06-30 PROCEDURE — 96375 TX/PRO/DX INJ NEW DRUG ADDON: CPT

## 2022-06-30 PROCEDURE — 6360000004 HC RX CONTRAST MEDICATION

## 2022-06-30 PROCEDURE — 71045 X-RAY EXAM CHEST 1 VIEW: CPT

## 2022-06-30 PROCEDURE — 2500000003 HC RX 250 WO HCPCS

## 2022-06-30 PROCEDURE — 6360000002 HC RX W HCPCS

## 2022-06-30 PROCEDURE — 6370000000 HC RX 637 (ALT 250 FOR IP)

## 2022-06-30 PROCEDURE — 74177 CT ABD & PELVIS W/CONTRAST: CPT

## 2022-06-30 PROCEDURE — 85025 COMPLETE CBC W/AUTO DIFF WBC: CPT

## 2022-06-30 PROCEDURE — 83690 ASSAY OF LIPASE: CPT

## 2022-06-30 PROCEDURE — 80048 BASIC METABOLIC PNL TOTAL CA: CPT

## 2022-06-30 PROCEDURE — 2580000003 HC RX 258

## 2022-06-30 PROCEDURE — 96374 THER/PROPH/DIAG INJ IV PUSH: CPT

## 2022-06-30 PROCEDURE — 99285 EMERGENCY DEPT VISIT HI MDM: CPT

## 2022-06-30 RX ORDER — FENTANYL CITRATE 50 UG/ML
25 INJECTION, SOLUTION INTRAMUSCULAR; INTRAVENOUS ONCE
Status: COMPLETED | OUTPATIENT
Start: 2022-06-30 | End: 2022-06-30

## 2022-06-30 RX ORDER — 0.9 % SODIUM CHLORIDE 0.9 %
1000 INTRAVENOUS SOLUTION INTRAVENOUS ONCE
Status: COMPLETED | OUTPATIENT
Start: 2022-06-30 | End: 2022-07-01

## 2022-06-30 RX ADMIN — FAMOTIDINE 20 MG: 10 INJECTION, SOLUTION INTRAVENOUS at 23:54

## 2022-06-30 RX ADMIN — LIDOCAINE HYDROCHLORIDE: 20 SOLUTION ORAL; TOPICAL at 23:54

## 2022-06-30 RX ADMIN — IOPAMIDOL 80 ML: 755 INJECTION, SOLUTION INTRAVENOUS at 22:26

## 2022-06-30 RX ADMIN — FENTANYL CITRATE 25 MCG: 50 INJECTION, SOLUTION INTRAMUSCULAR; INTRAVENOUS at 22:12

## 2022-06-30 RX ADMIN — SODIUM CHLORIDE 1000 ML: 9 INJECTION, SOLUTION INTRAVENOUS at 22:12

## 2022-06-30 ASSESSMENT — ENCOUNTER SYMPTOMS
COUGH: 0
RESPIRATORY NEGATIVE: 1
SINUS PAIN: 0
VOMITING: 0
PHOTOPHOBIA: 0
ABDOMINAL DISTENTION: 0
SHORTNESS OF BREATH: 0
SINUS PRESSURE: 0
ABDOMINAL PAIN: 1
CONSTIPATION: 0
EYES NEGATIVE: 1
CHEST TIGHTNESS: 0
ALLERGIC/IMMUNOLOGIC NEGATIVE: 1
DIARRHEA: 0
NAUSEA: 1

## 2022-06-30 ASSESSMENT — PAIN - FUNCTIONAL ASSESSMENT: PAIN_FUNCTIONAL_ASSESSMENT: 0-10

## 2022-06-30 ASSESSMENT — PAIN DESCRIPTION - ORIENTATION: ORIENTATION: LEFT

## 2022-06-30 ASSESSMENT — PAIN SCALES - GENERAL: PAINLEVEL_OUTOF10: 8

## 2022-06-30 ASSESSMENT — PAIN DESCRIPTION - LOCATION: LOCATION: ABDOMEN

## 2022-06-30 NOTE — Clinical Note
Ruy Joyce was seen and treated in our emergency department on 6/30/2022. He may return to work on 07/04/2022. If you have any questions or concerns, please don't hesitate to call.       Zunilda Hedrick MD

## 2022-07-01 VITALS
HEART RATE: 85 BPM | SYSTOLIC BLOOD PRESSURE: 126 MMHG | HEIGHT: 74 IN | RESPIRATION RATE: 18 BRPM | DIASTOLIC BLOOD PRESSURE: 88 MMHG | TEMPERATURE: 98.1 F | BODY MASS INDEX: 20.53 KG/M2 | OXYGEN SATURATION: 98 % | WEIGHT: 160 LBS

## 2022-07-01 LAB
EKG ATRIAL RATE: 60 BPM
EKG P AXIS: 57 DEGREES
EKG P-R INTERVAL: 154 MS
EKG Q-T INTERVAL: 420 MS
EKG QRS DURATION: 86 MS
EKG QTC CALCULATION (BAZETT): 420 MS
EKG R AXIS: 51 DEGREES
EKG T AXIS: 58 DEGREES
EKG VENTRICULAR RATE: 60 BPM
PRO-BNP: 11.8 PG/ML (ref 0–450)
TROPONIN T: < 0.01 NG/ML
TROPONIN T: < 0.01 NG/ML

## 2022-07-01 PROCEDURE — 93005 ELECTROCARDIOGRAM TRACING: CPT

## 2022-07-01 PROCEDURE — 84484 ASSAY OF TROPONIN QUANT: CPT

## 2022-07-01 PROCEDURE — 93010 ELECTROCARDIOGRAM REPORT: CPT | Performed by: INTERNAL MEDICINE

## 2022-07-01 PROCEDURE — 83880 ASSAY OF NATRIURETIC PEPTIDE: CPT

## 2022-07-01 PROCEDURE — 36415 COLL VENOUS BLD VENIPUNCTURE: CPT

## 2022-07-01 RX ORDER — OXYMETAZOLINE HYDROCHLORIDE 0.05 G/100ML
2 SPRAY NASAL 2 TIMES DAILY
Status: DISCONTINUED | OUTPATIENT
Start: 2022-07-01 | End: 2022-07-01

## 2022-07-01 ASSESSMENT — PAIN SCALES - GENERAL
PAINLEVEL_OUTOF10: 3
PAINLEVEL_OUTOF10: 5

## 2022-07-01 ASSESSMENT — PAIN - FUNCTIONAL ASSESSMENT: PAIN_FUNCTIONAL_ASSESSMENT: 0-10

## 2022-07-01 NOTE — ED PROVIDER NOTES
[facility] ENCOUNTER          Pt Name: Terrence Dale  MRN: [id number]  [place] 1975  Date of evaluation: 6/30/2022  Treating Resident Physician: No Chirinos MD  Supervising Physician: Dr. Gamal Yen    History obtained from the patient. CHIEF COMPLAINT     No chief complaint on file. HISTORY OF PRESENT ILLNESS    HPI  Terrence Dale is a 41 y.o. male who presents to the emergency department for evaluation of abdominal pain    Patient states that around 6:00 today he started having intense left upper quadrant abdominal pain. He rates the pain 8 out of 10 intensity associated with nausea without vomiting. Pain is nonradiating. Patient states that he has been having hard difficult to pass stools for the last several days and cannot really last time he had a complete bowel movement. Patient states that when the pain first started he did \"take his breath away\". Patient is denying having any chest pain, lightheadedness, dizziness, fevers, chills, cough, lightheadedness, dizziness, numbness, weakness. Patient states he did have this pain once before approximately 1 year ago and lasted for about 8 hours. Patient said at that time he thought that it was his heart so they did a stress test and echo which was negative. Patient states he smokes marijuana daily as well as endorses heavy alcohol consumption nightly stating that he drinks a 12 pack and some liquor every day. Patient denies any history of withdrawals DVTs or seizures. The patient has no other acute complaints at this time. REVIEW OF SYSTEMS   Review of Systems   Constitutional: Negative. Negative for activity change, chills, fatigue and fever. HENT: Negative. Negative for sinus pressure and sinus pain. Eyes: Negative. Negative for photophobia and visual disturbance. Respiratory: Negative. Negative for cough, chest tightness and shortness of breath. Cardiovascular: Negative. Negative for chest pain and leg swelling. Gastrointestinal: Positive for abdominal pain and nausea. Negative for abdominal distention, constipation, diarrhea and vomiting. Endocrine: Negative. Genitourinary: Negative. Negative for dysuria and hematuria. Musculoskeletal: Negative. Skin: Negative. Negative for rash and wound. Allergic/Immunologic: Negative. Neurological: Negative. Negative for dizziness, light-headedness, numbness and headaches. Hematological: Negative. Psychiatric/Behavioral: Negative. Negative for agitation and confusion. All other systems reviewed and are negative. PAST MEDICAL AND SURGICAL HISTORY     Past Medical History:   Diagnosis Date    Heartburn     Unintentional weight loss      Past Surgical History:   Procedure Laterality Date    COLONOSCOPY Left 7/29/2020    COLONOSCOPY POLYPECTOMY SNARE/COLD BIOPSY performed by Agus Martines MD at [address] Left 7/29/2020    EGD BIOPSY performed by Agus Martines MD at [address] [place] [address]   No current facility-administered medications for this encounter. Current Outpatient Medications:     ondansetron (ZOFRAN ODT) 4 MG disintegrating tablet, Take 1 tablet by mouth every 8 hours as needed for Nausea, Disp: 20 tablet, Rfl: 0    naproxen (NAPROSYN) 500 MG tablet, Take 1 tablet by mouth 2 times daily Do not take with ibuprofen, advil, motrin, or aleve., Disp: 60 tablet, Rfl: 0    omeprazole (PRILOSEC) 20 MG delayed release capsule, 1 capsule, Disp: , Rfl:     ibuprofen (IBU) 400 MG tablet, Take 1 tablet by mouth every 6 hours as needed for Pain, Disp: 20 tablet, Rfl: 0    therapeutic multivitamin-minerals (THERAGRAN-M) tablet, Take 1 tablet by mouth daily.   , Disp: , Rfl:       SOCIAL HISTORY     Social History     Social History Narrative    Not on file     Social History     Tobacco Use    Smoking status: Former Smoker Packs/day: 1.00     Types: Cigarettes    Smokeless tobacco: Never Used   Vaping Use    Vaping Use: Never used   Substance Use Topics    Alcohol use: Yes    Drug use: Not Currently     Types: Marijuana (Weed)         ALLERGIES   No Known Allergies      FAMILY HISTORY     Family History   Problem Relation Age of Onset    Colon Cancer Mother         passed at 36    Prostate Cancer Father          PREVIOUS RECORDS   Previous records reviewed: Patient was last seen in the emergency department December 12, 2021 for the flu. PHYSICAL EXAM     ED Triage Vitals   BP Temp Temp Source Heart Rate Resp SpO2 Height Weight   06/30/22 2115 06/30/22 2118 06/30/22 2118 06/30/22 2115 06/30/22 2115 06/30/22 2115 06/30/22 2115 06/30/22 2115   (!) 155/103 98.1 °F (36.7 °C) Oral 85 18 99 % 6' 2\" (1.88 m) 160 lb (72.6 kg)     Initial vital signs and nursing assessment reviewed and normal. Body mass index is 20.54 kg/m². Pulsoximetry is normal per my interpretation. Additional Vital Signs:  Vitals:    07/01/22 0115   BP: 126/88   Pulse:    Resp:    Temp:    SpO2: 98%       Physical Exam  Vitals and nursing note reviewed. Constitutional:       Appearance: He is normal weight. HENT:      Head: Normocephalic and atraumatic. Eyes:      Extraocular Movements: Extraocular movements intact. Pupils: Pupils are equal, round, and reactive to light. Cardiovascular:      Rate and Rhythm: Normal rate and regular rhythm. Pulses: Normal pulses. Heart sounds: Normal heart sounds. Pulmonary:      Effort: Pulmonary effort is normal.      Breath sounds: Normal breath sounds. Abdominal:      General: Abdomen is flat. Bowel sounds are normal.      Palpations: Abdomen is soft. Tenderness: There is abdominal tenderness in the left upper quadrant. There is no right CVA tenderness, left CVA tenderness, guarding or rebound. Musculoskeletal:         General: Normal range of motion.    Skin:     General: Skin is warm findings. This document has been electronically signed by: Jyoti Mittal MD    on 06/30/2022 10:54 PM      All CTs at this facility use dose modulation techniques and iterative    reconstructions, and/or weight-based dosing   when appropriate to reduce radiation to a low as reasonably achievable. ED Medications administered this visit:   Medications   fentaNYL (SUBLIMAZE) injection 25 mcg (25 mcg IntraVENous Given 6/30/22 2212)   0.9 % sodium chloride bolus (0 mLs IntraVENous Stopped 7/1/22 0014)   iopamidol (ISOVUE-370) 76 % injection 80 mL (80 mLs IntraVENous Given 6/30/22 2226)   famotidine (PEPCID) 20 mg in sodium chloride (PF) 10 mL injection (20 mg IntraVENous Given 6/30/22 2354)   aluminum & magnesium hydroxide-simethicone (MAALOX) 30 mL, lidocaine viscous hcl (XYLOCAINE) 5 mL (GI COCKTAIL) ( Oral Given 6/30/22 2354)         ED COURSE     ED Course as of 07/01/22 0332   Fri Jul 01, 2022   0325 Patient's work-up is overall unremarkable. Patient reports moderate improvement in his pain after receiving the GI cocktail. Patient will be advised to follow-up with his primary care doctor for nonemergent work-up of his pain which likely will need to be referred out to a GI specialist.  Patient expressed understanding and agrees with plan of discharge. Patient provided a work note with return to work scheduled for Monday. Patient's vital signs have been stable while he has been here he is not tachycardic not tachypneic and not hypoxic. Patient's laboratory work-up is overall unremarkable. [MARK]      ED Course User Index  [MARK] Janice Mcguire MD       Strict return precautions and follow up instructions were discussed with the patient prior to discharge, with which the patient agrees.       MEDICATION CHANGES     Discharge Medication List as of 7/1/2022  3:25 AM            FINAL DISPOSITION     Final diagnoses:   Left upper quadrant abdominal pain     Condition: condition: stable  Dispo: Discharge to home      This transcription was electronically signed. Parts of this transcriptions may have been dictated by use of voice recognition software and electronically transcribed, and parts may have been transcribed with the assistance of an ED scribe. The transcription may contain errors not detected in proofreading. Please refer to my supervising physician's documentation if my documentation differs.     Electronically Signed: Jarod Lee MD, 07/01/22, 3:32 AM         Shania Branch MD  Resident  07/01/22 8797

## 2022-07-01 NOTE — ED PROVIDER NOTES
[address] [facility] MEDICINE ATTENDING ATTESTATION      Evaluation of Dayami Rivera. Case discussed and care plan developed with resident physician. I agree with the resident physician documentation and plan as documented by him, except if my documentation differs. Patient seen, interviewed and examined by me. I reviewed the medical, surgical, family and social history, medications and allergies. I have reviewed the nursing documentation. I have reviewed the patient's vital signs and are normal per my interpretation. Body mass index is 20.54 kg/m². Pulsoxymetry is normal per my interpretation. Brief H&P   Patient c/o left upper quadrant abdominal pain, has had in the past with negative cardiac work-up, nausea, reported chest pain that started while in the emergency department    Physical exam is notable for well appearing, left upper quadrant tenderness to palpation      Medical Decision Making   MDM:   Patient is a 28-year-old male who presents with left upper quadrant abdominal pain and then developed chest pain while in the emergency department. Work-up shows unremarkable labs, negative troponin x2, EKG showing normal sinus rhythm without ischemic changes, CT abdomen/pelvis negative for acute pathology. Symptoms improved with symptomatic treatment    Plan:    PCP follow-up, consider GI follow-up   Return precautions    Please see the resident physician completed note for final disposition except as documented on this attestation. I have reviewed and interpreted all available lab, radiology and ekg results available at the moment. Diagnosis, treatment and disposition plans were discussed and agreed upon by patient. This transcription was electronically signed. It was dictated by use of voice recognition software and electronically transcribed. The transcription may contain errors not detected in proofreading.      I performed direct supervision and was present for the critical portion following procedures: None  Critical care time on this case: None    Electronically signed by Prudence Shaffer MD on 7/1/22 at 5:42 AM EDT       Prudence Shaffer MD  07/01/22 4300

## 2022-07-01 NOTE — ED NOTES
Pt denies needs at this time, informed of POC.  Call light remains in place      Zaida Oropeza  07/01/22 0128

## 2022-08-18 ENCOUNTER — APPOINTMENT (OUTPATIENT)
Dept: GENERAL RADIOLOGY | Age: 47
End: 2022-08-18
Payer: COMMERCIAL

## 2022-08-18 ENCOUNTER — HOSPITAL ENCOUNTER (EMERGENCY)
Age: 47
Discharge: HOME | End: 2022-08-18
Payer: COMMERCIAL

## 2022-08-18 VITALS
HEART RATE: 93 BPM | TEMPERATURE: 98.5 F | WEIGHT: 156 LBS | BODY MASS INDEX: 20.03 KG/M2 | RESPIRATION RATE: 18 BRPM | OXYGEN SATURATION: 98 % | SYSTOLIC BLOOD PRESSURE: 156 MMHG | DIASTOLIC BLOOD PRESSURE: 84 MMHG

## 2022-08-18 DIAGNOSIS — K29.00 ACUTE GASTRITIS WITHOUT HEMORRHAGE, UNSPECIFIED GASTRITIS TYPE: ICD-10-CM

## 2022-08-18 DIAGNOSIS — R07.9 CHEST PAIN WITH LOW RISK FOR CARDIAC ETIOLOGY: Primary | ICD-10-CM

## 2022-08-18 LAB
ANION GAP SERPL CALCULATED.3IONS-SCNC: 12 MEQ/L (ref 8–16)
BASOPHILS # BLD: 0.4 %
BASOPHILS ABSOLUTE: 0 THOU/MM3 (ref 0–0.1)
BUN BLDV-MCNC: 14 MG/DL (ref 7–22)
CALCIUM SERPL-MCNC: 9.5 MG/DL (ref 8.5–10.5)
CHLORIDE BLD-SCNC: 104 MEQ/L (ref 98–111)
CO2: 26 MEQ/L (ref 23–33)
CREAT SERPL-MCNC: 0.9 MG/DL (ref 0.4–1.2)
EOSINOPHIL # BLD: 0.5 %
EOSINOPHILS ABSOLUTE: 0 THOU/MM3 (ref 0–0.4)
ERYTHROCYTE [DISTWIDTH] IN BLOOD BY AUTOMATED COUNT: 13.6 % (ref 11.5–14.5)
ERYTHROCYTE [DISTWIDTH] IN BLOOD BY AUTOMATED COUNT: 44.9 FL (ref 35–45)
GFR SERPL CREATININE-BSD FRML MDRD: > 90 ML/MIN/1.73M2
GLUCOSE BLD-MCNC: 96 MG/DL (ref 70–108)
HCT VFR BLD CALC: 38.8 % (ref 42–52)
HEMOGLOBIN: 12.9 GM/DL (ref 14–18)
IMMATURE GRANS (ABS): 0.02 THOU/MM3 (ref 0–0.07)
IMMATURE GRANULOCYTES: 0.4 %
LYMPHOCYTES # BLD: 29.8 %
LYMPHOCYTES ABSOLUTE: 1.7 THOU/MM3 (ref 1–4.8)
MCH RBC QN AUTO: 30 PG (ref 26–33)
MCHC RBC AUTO-ENTMCNC: 33.2 GM/DL (ref 32.2–35.5)
MCV RBC AUTO: 90.2 FL (ref 80–94)
MONOCYTES # BLD: 8.6 %
MONOCYTES ABSOLUTE: 0.5 THOU/MM3 (ref 0.4–1.3)
NUCLEATED RED BLOOD CELLS: 0 /100 WBC
OSMOLALITY CALCULATION: 283.5 MOSMOL/KG (ref 275–300)
PLATELET # BLD: 256 THOU/MM3 (ref 130–400)
PMV BLD AUTO: 9.7 FL (ref 9.4–12.4)
POTASSIUM REFLEX MAGNESIUM: 3.7 MEQ/L (ref 3.5–5.2)
RBC # BLD: 4.3 MILL/MM3 (ref 4.7–6.1)
SEG NEUTROPHILS: 60.3 %
SEGMENTED NEUTROPHILS ABSOLUTE COUNT: 3.4 THOU/MM3 (ref 1.8–7.7)
SODIUM BLD-SCNC: 142 MEQ/L (ref 135–145)
TROPONIN T: < 0.01 NG/ML
TROPONIN T: < 0.01 NG/ML
WBC # BLD: 5.6 THOU/MM3 (ref 4.8–10.8)

## 2022-08-18 PROCEDURE — 71045 X-RAY EXAM CHEST 1 VIEW: CPT

## 2022-08-18 PROCEDURE — 80048 BASIC METABOLIC PNL TOTAL CA: CPT

## 2022-08-18 PROCEDURE — 6370000000 HC RX 637 (ALT 250 FOR IP): Performed by: NURSE PRACTITIONER

## 2022-08-18 PROCEDURE — 85025 COMPLETE CBC W/AUTO DIFF WBC: CPT

## 2022-08-18 PROCEDURE — 36415 COLL VENOUS BLD VENIPUNCTURE: CPT

## 2022-08-18 PROCEDURE — 93005 ELECTROCARDIOGRAM TRACING: CPT | Performed by: EMERGENCY MEDICINE

## 2022-08-18 PROCEDURE — 84484 ASSAY OF TROPONIN QUANT: CPT

## 2022-08-18 PROCEDURE — 99285 EMERGENCY DEPT VISIT HI MDM: CPT

## 2022-08-18 RX ADMIN — LIDOCAINE HYDROCHLORIDE: 20 SOLUTION ORAL; TOPICAL at 20:36

## 2022-08-18 ASSESSMENT — ENCOUNTER SYMPTOMS
ABDOMINAL PAIN: 0
CHEST TIGHTNESS: 1
ABDOMINAL DISTENTION: 1
CONSTIPATION: 0
NAUSEA: 1
VOMITING: 0
COLOR CHANGE: 0
SHORTNESS OF BREATH: 0

## 2022-08-18 ASSESSMENT — HEART SCORE: ECG: 0

## 2022-08-18 NOTE — ED TRIAGE NOTES
Pt arrives to ED from home with c/o chest pain that comes and goes.  Pt states it feels heavy and in the middle of his chest   Started around noon today

## 2022-08-19 LAB
EKG ATRIAL RATE: 79 BPM
EKG P AXIS: 66 DEGREES
EKG P-R INTERVAL: 148 MS
EKG Q-T INTERVAL: 384 MS
EKG QRS DURATION: 88 MS
EKG QTC CALCULATION (BAZETT): 440 MS
EKG R AXIS: 64 DEGREES
EKG T AXIS: 66 DEGREES
EKG VENTRICULAR RATE: 79 BPM

## 2022-08-19 PROCEDURE — 93010 ELECTROCARDIOGRAM REPORT: CPT | Performed by: INTERNAL MEDICINE

## 2022-08-19 ASSESSMENT — ENCOUNTER SYMPTOMS
BACK PAIN: 0
EYE REDNESS: 0
COUGH: 0
RHINORRHEA: 0

## 2022-08-19 NOTE — ED PROVIDER NOTES
[facility] Emergency Department    CHIEF COMPLAINT       Chief Complaint   Patient presents with    Chest Pain       Nurses Notes reviewed and I agree except as noted in the HPI. HISTORY OF PRESENT ILLNESS    Sebastian Kulkarni is a 41 y.o. male who presents to the ED for evaluation of chest pain. He states he has had this pain in the past however, this is worse than before. He is currently evaluated by GI, takes Protonix QD, and has an EGD scheduled for 9/7. He states the pain is on the left side of chest and does not radiate. He describes it as a crushing pain and rates it 6/10. He has some associated nausea without vomiting, bloating, and gassiness. The pain often worsens around 2 hours after eating and worsens when laying down. He took his Protonix today and has tried Tums which have not helped. He denies SOB, radiation of pain, diaphoresis, worsening with activity, and bilious vomiting. Social historyi\ is remarkable for drinking 6 pack of beer per day and previous cigarette smoking. HPI was provided by the patient    REVIEW OF SYSTEMS     Review of Systems   Constitutional:  Negative for chills, diaphoresis, fatigue and fever. HENT:  Negative for congestion, ear discharge, ear pain, postnasal drip and rhinorrhea. Eyes:  Negative for redness. Respiratory:  Positive for chest tightness. Negative for cough and shortness of breath. Cardiovascular:  Positive for chest pain. Negative for palpitations and leg swelling. Gastrointestinal:  Positive for abdominal distention and nausea. Negative for abdominal pain, constipation and vomiting. Endocrine: Negative. Genitourinary:  Negative for difficulty urinating, dysuria, enuresis, flank pain and hematuria. Musculoskeletal:  Negative for arthralgias, back pain, joint swelling and myalgias. Skin:  Negative for color change and rash. Neurological:  Negative for dizziness, light-headedness, numbness and headaches. Psychiatric/Behavioral:  Negative for agitation, behavioral problems and confusion. All other systems negative except as noted. PAST MEDICAL HISTORY     Past Medical History:   Diagnosis Date    Heartburn     Unintentional weight loss        SURGICALHISTORY      has a past surgical history that includes Colonoscopy (Left, 2020) and Upper gastrointestinal endoscopy (Left, 2020). CURRENT MEDICATIONS       Discharge Medication List as of 2022 10:25 PM        CONTINUE these medications which have NOT CHANGED    Details   pantoprazole (PROTONIX) 40 MG tablet Take 1 tablet by mouth every morning (before breakfast), Disp-30 tablet, R-0Normal      ondansetron (ZOFRAN ODT) 4 MG disintegrating tablet Take 1 tablet by mouth every 8 hours as needed for Nausea, Disp-20 tablet, R-0Normal      therapeutic multivitamin-minerals (THERAGRAN-M) tablet Take 1 tablet by mouth daily. ALLERGIES     has No Known Allergies. FAMILY HISTORY     He indicated that his mother is . He indicated that his father is . He indicated that the status of his neg hx is unknown.   family history includes Colon Cancer in his father and mother; Prostate Cancer in his father.     SOCIAL HISTORY       Social History     Socioeconomic History    Marital status: Single     Spouse name: Not on file    Number of children: Not on file    Years of education: Not on file    Highest education level: Not on file   Occupational History    Not on file   Tobacco Use    Smoking status: Former     Packs/day: 1.00     Types: Cigarettes     Quit date:      Years since quittin.6    Smokeless tobacco: Never   Vaping Use    Vaping Use: Never used   Substance and Sexual Activity    Alcohol use: Yes    Drug use: Not Currently     Types: Marijuana Yamileth Postin)    Sexual activity: Not on file   Other Topics Concern    Not on file   Social History Narrative    Not on file     Social Determinants of Health     Financial Skin:     General: Skin is warm and dry. Capillary Refill: Capillary refill takes less than 2 seconds. Coloration: Skin is not jaundiced or pale. Findings: No erythema or rash. Neurological:      General: No focal deficit present. Mental Status: He is alert and oriented to person, place, and time. Cranial Nerves: No cranial nerve deficit. Psychiatric:         Mood and Affect: Mood normal.         Behavior: Behavior normal.       DIFFERENTIAL DIAGNOSIS:   Gastritis   GERD  Peptic ulcer disease   ACS- Patient had negative troponins, NSR without St changes on ECG, and a heart score of 2   Pericarditis    DIAGNOSTIC RESULTS     EKG: All EKG's are interpreted by the Emergency Department Physician who eithersigns or Co-signs this chart in the absence of a cardiologist.        RADIOLOGY: non-plainfilm images(s) such as CT, Ultrasound and MRI are read by the radiologist.  Plain radiographic images are visualized and preliminarily interpreted by the emergency physician unless otherwise stated below. XR CHEST PORTABLE   Final Result   1. No acute cardiopulmonary abnormality. This document has been electronically signed by:  Nataly Vera MD on    08/18/2022 07:40 PM            LABS:   Labs Reviewed   CBC WITH AUTO DIFFERENTIAL - Abnormal; Notable for the following components:       Result Value    RBC 4.30 (*)     Hemoglobin 12.9 (*)     Hematocrit 38.8 (*)     All other components within normal limits   BASIC METABOLIC PANEL W/ REFLEX TO MG FOR LOW K   TROPONIN   ANION GAP   GLOMERULAR FILTRATION RATE, ESTIMATED   OSMOLALITY   TROPONIN       EMERGENCY DEPARTMENT COURSE:   Vitals:    Vitals:    08/18/22 1848   BP: (!) 156/84   Pulse: 93   Resp: 18   Temp: 98.5 °F (36.9 °C)   TempSrc: Oral   SpO2: 98%   Weight: 156 lb (70.8 kg)        Plan:    Order the following: Troponins, CBC with diff, BMP, anion gap, GFR, osmolality, CXR  Medications: GI cocktail  Goal: Pain management and ACS rule out Heart Score for chest pain patients  History: Slightly Suspicious  ECG: Normal  Patient Age: < 39 years  *Risk factors for Atherosclerotic disease: Cigarette smoking, Positive family History, Hypertension  Risk Factors: > 3 Risk factors or history of atherosclerotic disease*  Troponin: < 1X normal limit  Heart Score Total: 2           Internal Administration   First Dose      Second Dose           Last COVID Lab SARS-CoV-2 (no units)   Date Value   07/25/2020 Not Detected     SARS-CoV-2, NAAT (no units)   Date Value   12/20/2021 NOT  DETECTED            MDM    Patient was seen and evaluated in the emergency department, patient appeared to be in stable condition. Vital signs assessed, no abnormality noted. Physical exam completed. Physical exam appeared unremarkable. ECG, troponins, CBC, BMP, anion gap, GFR, osmolality ordered. Based on my physical exam and work up I believe the patient to have gastritis vs GERD. I discussed my findings and plan of care with patient. They are amenable with GI cocktail and continued follow-up with outpatient GI as well as referral to cardiology for chest pain. While here in the emergency department they maintained a stable course and were appropriate for discharge. No notes of  Admission Criteria type on file. Medications   aluminum & magnesium hydroxide-simethicone (MAALOX) 30 mL, lidocaine viscous hcl (XYLOCAINE) 5 mL (GI COCKTAIL) ( Oral Given 8/18/22 2036)       Please note that the patient was evaluated during a pandemic. All efforts were made for HIPPA compliance as well as provision of appropriate care. Patient was seen independently by myself. The patient's final impression and disposition and plan was determined by myself. Strict return precautions and follow up instructions were discussed with the patient prior to discharge, with which the patient agrees.     Physical assessment findings, diagnostic testing(s) if applicable, and vital signs reviewed with patient/patient representative. Questions answered. Medications asdirected, including OTC medications for supportive care. Education provided on medications. Differential diagnosis(s) discussed with patient/patient representative. Home care/self care instructions reviewed withpatient/patient representative. Patient is to follow-up with family care provider in 2-3 days if no improvement. Patient is to go to the emergency department if symptoms worsen. Patient/patient representative isaware of care plan, questions answered, verbalizes understanding and is in agreement. CRITICAL CARE:   None    CONSULTS:  None    PROCEDURES:  None    FINAL IMPRESSION     1. Chest pain with low risk for cardiac etiology    2.  Acute gastritis without hemorrhage, unspecified gastritis type          DISPOSITION/PLAN   DISPOSITION Decision To Discharge 08/18/2022 09:58:55 PM      PATIENT REFERREDTO:  NORMA Navas - CESIA MATHIS[address]     Schedule an appointment as soon as possible for a visit in 3 days  For follow up    [address] [facility]  [address]  Go on 8/22/2022  @ 0930 am, For follow up    DISCHARGE MEDICATIONS:  Discharge Medication List as of 8/18/2022 10:25 PM          (Please note that portions of this note were completed with a voice recognition program.  Efforts were made to edit the dictations but occasionally words are mis-transcribed.)       LORENZO UnderwoodS  Esme Rothman, APRN - NORMA Carpenter CNP  08/19/22 0423

## 2022-08-22 ENCOUNTER — OFFICE VISIT (OUTPATIENT)
Dept: CARDIOLOGY CLINIC | Age: 47
End: 2022-08-22
Payer: COMMERCIAL

## 2022-08-22 VITALS
WEIGHT: 157.6 LBS | HEIGHT: 74 IN | BODY MASS INDEX: 20.23 KG/M2 | SYSTOLIC BLOOD PRESSURE: 131 MMHG | HEART RATE: 96 BPM | DIASTOLIC BLOOD PRESSURE: 96 MMHG

## 2022-08-22 DIAGNOSIS — K21.9 GASTROESOPHAGEAL REFLUX DISEASE, UNSPECIFIED WHETHER ESOPHAGITIS PRESENT: ICD-10-CM

## 2022-08-22 DIAGNOSIS — R07.89 CHEST PAIN, ATYPICAL: Primary | ICD-10-CM

## 2022-08-22 PROCEDURE — 99214 OFFICE O/P EST MOD 30 MIN: CPT | Performed by: INTERNAL MEDICINE

## 2022-08-22 NOTE — PROGRESS NOTES
Chief Complaint   Patient presents with    Follow-Up from Hospital       Patient Active Problem List   Diagnosis    Chest pain    Chest pain, atypical    History of chest pain Atypical    GERD (gastroesophageal reflux disease)     Was sent from ED form cp          Hosp fu- chest pain  Seen in ed 22    Planned for EGD  2022    Chest pain, heaviness, sharp, bloating, not exertion related  No inducer or reliever  Last hourse  Freq  3x/ week    Started on protonix and that helped a lot and still some     No cp today    Denied sob or dizziness  Ekg 22      Hx of atypical cp- chronic     Feeling much better    Denied sob, HA, dizziness, palpitations or swelling      FHX  None for CAD    Smokes 1 ppd for 24 yrs    Past Surgical History:   Procedure Laterality Date    COLONOSCOPY Left 2020    COLONOSCOPY POLYPECTOMY SNARE/COLD BIOPSY performed by Dean Barnett MD at [facility]    UPPER GASTROINTESTINAL ENDOSCOPY Left 2020    EGD BIOPSY performed by Dean Barnett MD at [facility] [place]       No Known Allergies     Family History   Problem Relation Age of Onset    Colon Cancer Mother         passed at 36    Colon Cancer Father     Prostate Cancer Father     Esophageal Cancer Neg Hx     Liver Cancer Neg Hx     Rectal Cancer Neg Hx     Stomach Cancer Neg Hx         Social History     Socioeconomic History    Marital status: Single     Spouse name: Not on file    Number of children: Not on file    Years of education: Not on file    Highest education level: Not on file   Occupational History    Not on file   Tobacco Use    Smoking status: Former     Packs/day: 1.00     Types: Cigarettes     Quit date:      Years since quittin.6    Smokeless tobacco: Never   Vaping Use    Vaping Use: Never used   Substance and Sexual Activity    Alcohol use: Yes    Drug use: Yes     Types: Marijuana Anna Ontiveros)     Comment: daily    Sexual activity: Not on file   Other Topics Concern    Not on file   Social History Narrative    Not on file     Social Determinants of Health     Financial Resource Strain: Not on file   Food Insecurity: Not on file   Transportation Needs: Not on file   Physical Activity: Not on file   Stress: Not on file   Social Connections: Not on file   Intimate Partner Violence: Not on file   Housing Stability: Not on file       Current Outpatient Medications   Medication Sig Dispense Refill    pantoprazole (PROTONIX) 40 MG tablet Take 1 tablet by mouth every morning (before breakfast) 30 tablet 0    therapeutic multivitamin-minerals (THERAGRAN-M) tablet Take 1 tablet by mouth daily. No current facility-administered medications for this visit. Review of Systems -     General ROS: negative  Psychological ROS: negative  Hematological and Lymphatic ROS: No history of blood clots or bleeding disorder. Respiratory ROS: no cough,  or wheezing, the rest see HPI  Cardiovascular ROS: See HPI  Gastrointestinal ROS: negative  Genito-Urinary ROS: no dysuria, trouble voiding, or hematuria  Musculoskeletal ROS: negative  Neurological ROS: no TIA or stroke symptoms  Dermatological ROS: negative      Blood pressure (!) 131/96, pulse 96, height 6' 2\" (1.88 m), weight 157 lb 9.6 oz (71.5 kg).         Physical Examination:    General appearance - alert, well appearing, and in no distress  HEENT- Pink conjunctiva  , Non-icteri sclera,PERRLA  Mental status - alert, oriented to person, place, and time  Neck - supple, no significant adenopathy, no JVD, or carotid bruits  Chest - clear to auscultation, no wheezes, rales or rhonchi, symmetric air entry  Heart - normal rate, regular rhythm, normal S1, S2, no murmurs, rubs, clicks or gallops  Abdomen - soft, nontender, nondistended, no masses or organomegaly  CONCHITA- no CVA or flank tenderness, no suprapubic tenderness  Neurological - alert, oriented, normal speech, no focal findings or movement disorder noted  Musculoskeletal/limbs - no joint tenderness, deformity or swelling   - peripheral pulses normal, no pedal edema, no clubbing or cyanosis  Skin - normal coloration and turgor, no rashes, no suspicious skin lesions noted  Psych- appropriate mood and affect    Lab  No results for input(s): CKTOTAL, CKMB, CKMBINDEX, TROPONINI in the last 72 hours.   CBC:   Lab Results   Component Value Date/Time    WBC 5.6 08/18/2022 07:04 PM    RBC 4.30 08/18/2022 07:04 PM    HGB 12.9 08/18/2022 07:04 PM    HCT 38.8 08/18/2022 07:04 PM    MCV 90.2 08/18/2022 07:04 PM    MCH 30.0 08/18/2022 07:04 PM    MCHC 33.2 08/18/2022 07:04 PM    RDW 13.6 09/27/2013 06:00 PM     08/18/2022 07:04 PM    MPV 9.7 08/18/2022 07:04 PM     BMP:    Lab Results   Component Value Date/Time     08/18/2022 07:04 PM    K 3.7 08/18/2022 07:04 PM     08/18/2022 07:04 PM    CO2 26 08/18/2022 07:04 PM    BUN 14 08/18/2022 07:04 PM    LABALBU 5.0 06/30/2022 09:30 PM    CREATININE 0.9 08/18/2022 07:04 PM    CALCIUM 9.5 08/18/2022 07:04 PM    LABGLOM >90 08/18/2022 07:04 PM    GLUCOSE 96 08/18/2022 07:04 PM     Hepatic Function Panel:    Lab Results   Component Value Date/Time    ALKPHOS 50 06/30/2022 09:30 PM    ALT 36 06/30/2022 09:30 PM    AST 37 06/30/2022 09:30 PM    PROT 7.5 06/30/2022 09:30 PM    BILITOT 0.6 06/30/2022 09:30 PM    BILIDIR <0.2 06/30/2022 09:30 PM    LABALBU 5.0 06/30/2022 09:30 PM     Magnesium:    Lab Results   Component Value Date/Time    MG 1.9 11/09/2020 03:37 PM     Warfarin PT/INR:  No components found for: PTPATWAR, PTINRWAR  HgBA1c:    Lab Results   Component Value Date/Time    LABA1C 5.3 01/18/2020 04:30 AM     FLP:    Lab Results   Component Value Date/Time    TRIG 200 04/04/2022 01:44 PM    HDL 73 04/04/2022 01:44 PM    LDLCALC 97 04/04/2022 01:44 PM     TSH:    Lab Results   Component Value Date/Time    TSH 0.844 04/04/2022 01:44 PM          Peak HR:174 bpm                      HR response: Appropriate    Peak BP:200/100 mmHg                 BP response: Normal Resting BP with    Predicted HR: 177 bpm                appropriate response to Stress    % of predicted HR: 98                HR/BP product:[id number]    Test duration:11 min                 Max exercise: 12.1 METS    Reason for termination:Target HR    achieved                             Exercise effort:Good             Imaging Results:Calculated gated LVEF 49 %. The T.I.D. ratio was 1.04 . There is moderate attenuation artifact noted in the inferior wall seems to   be related to bowel artifact. There was no evidence of definite reversible tracer uptake. The nuclear images is not suggestive for myocardial ischemia. Conclusions        Summary    Exercise EKG stress test is not suggestive for ischemia. This Nuclear Medicine study was negative for ischemia . Signatures        ----------------------------------------------------------------    Electronically signed by Renetta Simon MD (Interpreting    Cardiologist) on 01/18/2020 at 14:34    ----------------------------------------------------------------         Ekg4/6/2020  NSR, NO acute abn       Conclusions      Summary   Normal left ventricle size and systolic function. Ejection fraction was   estimated at 55 %. There were no regional left ventricular wall motion   abnormalities and wall thickness was within normal limits. Signature      ----------------------------------------------------------------   Electronically signed by Renetta Simon MD (Interpreting   physician) on 01/18/2020 at 06:20 PM    Normal sinus rhythm  Normal ECG  When compared with ECG of 24-MAR-2020 10:30,  No significant change was found  Confirmed by NIGEL CHARLES ([id number]) on 4/2/2020 8:03:39 AM    Ekg 8/18/22  Normal sinus rhythm  Normal ECG  When compared with ECG of 01-JUL-2022 00:01,  No significant change was found      Assessment       Diagnosis Orders   1. Chest pain, atypical        2.  Gastroesophageal reflux disease, unspecified whether esophagitis present Plan     The current meds and labs reviewed    ekg no abnromality  Troponin Negative    Hx of  chest pain is noncardiac  In nature- better  Chest pain recurrence  Atypical  Need ex nuc and echo  Cont with EGD  Avoid ibuprofen ( use to take)  Cont protonix  Add tums prn    Extensive discussion  Has been done with the patient    Lynne Flores to have EGD    D/w the pat the plan of care      RTC in  4 weeks      [facility]

## 2022-09-02 ENCOUNTER — TELEPHONE (OUTPATIENT)
Dept: ADMINISTRATIVE | Age: 47
End: 2022-09-02

## 2022-09-02 NOTE — TELEPHONE ENCOUNTER
Patient no-showed echo and martinez stress on 8/26/22; spoke with patient to reschedule testing to Friday, 9/16/22, with arrival time of 12:45 pm. All instructions reviewed via phone with patient. Patient advised to call the office right away if he cannot make any upcoming appt for any reason, patient verbalized understanding.

## 2022-09-07 ENCOUNTER — ANESTHESIA (OUTPATIENT)
Dept: ENDOSCOPY | Age: 47
End: 2022-09-07
Payer: COMMERCIAL

## 2022-09-07 ENCOUNTER — ANESTHESIA EVENT (OUTPATIENT)
Dept: ENDOSCOPY | Age: 47
End: 2022-09-07
Payer: COMMERCIAL

## 2022-09-07 ENCOUNTER — HOSPITAL ENCOUNTER (OUTPATIENT)
Age: 47
Setting detail: OUTPATIENT SURGERY
Discharge: HOME | End: 2022-09-07
Attending: INTERNAL MEDICINE | Admitting: INTERNAL MEDICINE
Payer: COMMERCIAL

## 2022-09-07 VITALS
BODY MASS INDEX: 19.87 KG/M2 | DIASTOLIC BLOOD PRESSURE: 75 MMHG | TEMPERATURE: 96.7 F | RESPIRATION RATE: 18 BRPM | SYSTOLIC BLOOD PRESSURE: 127 MMHG | WEIGHT: 154.8 LBS | HEIGHT: 74 IN | OXYGEN SATURATION: 100 % | HEART RATE: 77 BPM

## 2022-09-07 PROCEDURE — 3700000000 HC ANESTHESIA ATTENDED CARE: Performed by: INTERNAL MEDICINE

## 2022-09-07 PROCEDURE — 6360000002 HC RX W HCPCS: Performed by: STUDENT IN AN ORGANIZED HEALTH CARE EDUCATION/TRAINING PROGRAM

## 2022-09-07 PROCEDURE — 7100000011 HC PHASE II RECOVERY - ADDTL 15 MIN: Performed by: INTERNAL MEDICINE

## 2022-09-07 PROCEDURE — 88305 TISSUE EXAM BY PATHOLOGIST: CPT

## 2022-09-07 PROCEDURE — 2580000003 HC RX 258: Performed by: STUDENT IN AN ORGANIZED HEALTH CARE EDUCATION/TRAINING PROGRAM

## 2022-09-07 PROCEDURE — 3609012400 HC EGD TRANSORAL BIOPSY SINGLE/MULTIPLE: Performed by: INTERNAL MEDICINE

## 2022-09-07 PROCEDURE — 2580000003 HC RX 258: Performed by: INTERNAL MEDICINE

## 2022-09-07 PROCEDURE — 2500000003 HC RX 250 WO HCPCS: Performed by: STUDENT IN AN ORGANIZED HEALTH CARE EDUCATION/TRAINING PROGRAM

## 2022-09-07 PROCEDURE — 7100000010 HC PHASE II RECOVERY - FIRST 15 MIN: Performed by: INTERNAL MEDICINE

## 2022-09-07 PROCEDURE — 3609017100 HC EGD: Performed by: INTERNAL MEDICINE

## 2022-09-07 RX ORDER — LIDOCAINE HYDROCHLORIDE 20 MG/ML
INJECTION, SOLUTION EPIDURAL; INFILTRATION; INTRACAUDAL; PERINEURAL PRN
Status: DISCONTINUED | OUTPATIENT
Start: 2022-09-07 | End: 2022-09-07 | Stop reason: SDUPTHER

## 2022-09-07 RX ORDER — SODIUM CHLORIDE 9 MG/ML
INJECTION, SOLUTION INTRAVENOUS CONTINUOUS
Status: DISCONTINUED | OUTPATIENT
Start: 2022-09-07 | End: 2022-09-07 | Stop reason: HOSPADM

## 2022-09-07 RX ORDER — SODIUM CHLORIDE 9 MG/ML
INJECTION, SOLUTION INTRAVENOUS CONTINUOUS PRN
Status: DISCONTINUED | OUTPATIENT
Start: 2022-09-07 | End: 2022-09-07 | Stop reason: SDUPTHER

## 2022-09-07 RX ADMIN — PROPOFOL 150 MG: 10 INJECTION, EMULSION INTRAVENOUS at 08:54

## 2022-09-07 RX ADMIN — PROPOFOL 50 MG: 10 INJECTION, EMULSION INTRAVENOUS at 09:01

## 2022-09-07 RX ADMIN — SODIUM CHLORIDE: 9 INJECTION, SOLUTION INTRAVENOUS at 07:56

## 2022-09-07 RX ADMIN — SODIUM CHLORIDE: 9 INJECTION, SOLUTION INTRAVENOUS at 08:52

## 2022-09-07 RX ADMIN — LIDOCAINE HYDROCHLORIDE 100 MG: 20 INJECTION, SOLUTION EPIDURAL; INFILTRATION; INTRACAUDAL; PERINEURAL at 08:54

## 2022-09-07 ASSESSMENT — PAIN - FUNCTIONAL ASSESSMENT: PAIN_FUNCTIONAL_ASSESSMENT: NONE - DENIES PAIN

## 2022-09-07 NOTE — ANESTHESIA POSTPROCEDURE EVALUATION
Department of Anesthesiology  Postprocedure Note    Patient: Marcela Cantrell  MRN: [id number]  YOB: 1975  Date of evaluation: 9/7/2022      Procedure Summary     Date: 09/07/22 Room / Location: [address] [place] [address] / [address] [place] [address]    Anesthesia Start: 9189 Anesthesia Stop: 0906    Procedures:       EGD      EGD BIOPSY (Left: Esophagus) Diagnosis:       Epigastric pain      Atypical chest pain      (Epigastric pain [R10.13])      (Atypical chest pain [R07.89])    Surgeons: Jarvis Reed MD Responsible Provider: Dinesh Reed DO    Anesthesia Type: MAC ASA Status: 2          Anesthesia Type: No value filed.     Brendan Phase I: Brendan Score: 10    Brendan Phase II:        Anesthesia Post Evaluation    Patient location during evaluation: bedside  Patient participation: complete - patient participated  Level of consciousness: awake and alert  Airway patency: patent  Nausea & Vomiting: no vomiting and no nausea  Complications: no  Cardiovascular status: hemodynamically stable  Respiratory status: acceptable  Hydration status: stable

## 2022-09-07 NOTE — OP NOTE
[address] [place] [address]                                OPERATIVE REPORT    PATIENT NAME: Kyler Dover                  :        1975  MED REC NO:   [id number]                           ROOM:  ACCOUNT NO:   [de-identified]                           ADMIT DATE: 2022  PROVIDER:     Leslie Granger M.D.    DATE OF PROCEDURE:  2022    INDICATION:  The patient with history of gastric reflux as well as chest  discomfort, possible noncardiac. He is scheduled to have a stress test.  Plan today for upper endoscopy to evaluate for history of gastric reflux  as well as noncardiac stress test.    SURGEON:  Leslie Granger MD    ASA CLASSIFICATION:  II.    ESTIMATED BLOOD LOSS:  None. DESCRIPTION OF PROCEDURE:  The patient was brought to the GI lab. Consent was obtained. The risks involved with the procedure were  explained to the patient. Informed consent was obtained. The patient  was monitored during the procedure with pulse oximetry, blood pressure  monitoring, and oxygen by nasal cannula. Sedation by incremental doses  of IV propofol given by Anesthesia service to achieve monitored  anesthesia care. For ASA classification and medications given during  the procedure, please see Anesthesia note. PROCEDURE PERFORMED:  EGD with biopsy. A standard video upper scope 190 Olympus advanced under direct vision  from the oral cavity up to the duodenum. Esophagus features slight  irregularity at distal esophagus with feature of mild acid reflux. No  erosion or ulcerations seen. No feature of Hall's esophagus. The  finding is mild, cannot explain chest discomfort. Scope was advanced to  the stomach. Retroflex examination of the cardia revealed normal cardia  with no evidence of hiatus hernia. Antrum appears normal.  Pylorus  appears normal.  Duodenum, mild duodenitis. I elected to take a biopsy  of the duodenum. Scope withdrawn with no immediate complications. IMPRESSION:  1. Mild feature of acid reflux. 2.  Mild duodenitis, biopsy obtained to evaluate. PLAN:  The finding is really mild, cannot explain the chest discomfort. The patient is to follow up with his cardiologist, have stress tests and  other workup. If the patient continued to be symptomatic, consider  esophageal motility study as well as 24-hour pH monitoring to evaluate  for  esophagus and significant acid reflux. Dalton Bourne M.D.    D: 09/07/2022 9:24:20       T: 09/07/2022 12:22:30     [email or web address]  Job#: [id number]     Doc#: [id number]    CC:   Keyshawn Dunn CNP

## 2022-09-07 NOTE — ANESTHESIA PRE PROCEDURE
Department of Anesthesiology  Preprocedure Note       Name:  Yuly Woods   Age:  41 y.o.  :  1975                                          MRN:  [id number]         Date:  2022      Surgeon: Walter Blancas):  Silas Mendez MD    Procedure: Procedure(s):  EGD    Medications prior to admission:   Prior to Admission medications    Medication Sig Start Date End Date Taking? Authorizing Provider   pantoprazole (PROTONIX) 40 MG tablet Take 1 tablet by mouth every morning (before breakfast) 22  NORMA Fraga - CNP   therapeutic multivitamin-minerals [facility]) tablet Take 1 tablet by mouth daily. Historical Provider, MD       Current medications:    Current Facility-Administered Medications   Medication Dose Route Frequency Provider Last Rate Last Admin    0.9 % sodium chloride infusion   IntraVENous Continuous Silas Mendez MD 75 mL/hr at 22 0756 New Bag at 22 0756       Allergies:  No Known Allergies    Problem List:    Patient Active Problem List   Diagnosis Code    Chest pain R07.9    Chest pain, atypical R07.89    History of chest pain Atypical Z87.898    GERD (gastroesophageal reflux disease) K21.9       Past Medical History:        Diagnosis Date    Heartburn     Unintentional weight loss        Past Surgical History:        Procedure Laterality Date    COLONOSCOPY Left 2020    COLONOSCOPY POLYPECTOMY SNARE/COLD BIOPSY performed by Silas Mendez MD at [facility] [place] [facility]    UPPER GASTROINTESTINAL ENDOSCOPY Left 2020    EGD BIOPSY performed by Silas Mendez MD at [facility]       Social History:    Social History     Tobacco Use    Smoking status: Former     Packs/day: 1.00     Types: Cigarettes     Quit date:      Years since quittin.6    Smokeless tobacco: Never   Substance Use Topics    Alcohol use:  Yes                                Counseling given: Not Answered      Vital Signs (Current):   Vitals:    22 0746   BP: 132/77   Pulse: 80   Resp: 16   Temp: 97.8 °F (36.6 °C)   TempSrc: Temporal   SpO2: 100%   Weight: 154 lb 12.8 oz (70.2 kg)   Height: 6' 2\" (1.88 m)                                              BP Readings from Last 3 Encounters:   09/07/22 132/77   08/22/22 (!) 131/96   08/18/22 (!) 156/84       NPO Status: Time of last liquid consumption: 2100                        Time of last solid consumption: 2100                        Date of last liquid consumption: 09/06/22                        Date of last solid food consumption: 09/06/22    BMI:   Wt Readings from Last 3 Encounters:   09/07/22 154 lb 12.8 oz (70.2 kg)   08/22/22 157 lb 9.6 oz (71.5 kg)   08/18/22 156 lb (70.8 kg)     Body mass index is 19.88 kg/m². CBC:   Lab Results   Component Value Date/Time    WBC 5.6 08/18/2022 07:04 PM    RBC 4.30 08/18/2022 07:04 PM    HGB 12.9 08/18/2022 07:04 PM    HCT 38.8 08/18/2022 07:04 PM    MCV 90.2 08/18/2022 07:04 PM    RDW 13.6 09/27/2013 06:00 PM     08/18/2022 07:04 PM       CMP:   Lab Results   Component Value Date/Time     08/18/2022 07:04 PM    K 3.7 08/18/2022 07:04 PM     08/18/2022 07:04 PM    CO2 26 08/18/2022 07:04 PM    BUN 14 08/18/2022 07:04 PM    CREATININE 0.9 08/18/2022 07:04 PM    LABGLOM >90 08/18/2022 07:04 PM    GLUCOSE 96 08/18/2022 07:04 PM    PROT 7.5 06/30/2022 09:30 PM    CALCIUM 9.5 08/18/2022 07:04 PM    BILITOT 0.6 06/30/2022 09:30 PM    ALKPHOS 50 06/30/2022 09:30 PM    AST 37 06/30/2022 09:30 PM    ALT 36 06/30/2022 09:30 PM       POC Tests: No results for input(s): POCGLU, POCNA, POCK, POCCL, POCBUN, POCHEMO, POCHCT in the last 72 hours.     Coags: No results found for: PROTIME, INR, APTT    HCG (If Applicable): No results found for: PREGTESTUR, PREGSERUM, HCG, HCGQUANT     ABGs: No results found for: PHART, PO2ART, STB6QXK, IQP6CXI, BEART, F7LJPFQA     Type & Screen (If Applicable):  No results found for: LABABO, LABRH    Drug/Infectious Status (If Applicable):  No results found for: HIV, HEPCAB    COVID-19 Screening (If Applicable):   Lab Results   Component Value Date/Time    COVID19 NOT  DETECTED 12/20/2021 04:40 PM    COVID19 Not Detected 07/25/2020 10:39 AM           Anesthesia Evaluation   no history of anesthetic complications:   Airway: Mallampati: II  TM distance: >3 FB   Neck ROM: full  Mouth opening: > = 3 FB   Dental:          Pulmonary:normal exam        (-) COPD and asthma                           Cardiovascular:Negative CV ROS  Exercise tolerance: good (>4 METS),       (-) hypertension, past MI and CAD                Neuro/Psych:      (-) seizures and CVA           GI/Hepatic/Renal:   (+) GERD: well controlled,      (-) liver disease and no renal disease       Endo/Other:        (-) diabetes mellitus, hypothyroidism, hyperthyroidism               Abdominal:             Vascular:     - DVT. Other Findings:           Anesthesia Plan      MAC     ASA 2       Induction: intravenous. Anesthetic plan and risks discussed with patient.                         Heath Andrea DO   9/7/2022

## 2022-09-07 NOTE — PROGRESS NOTES
Recieved in Phase II, passing gas, taking fluids. Dr. Pritesh Serrano discussed findings. Dangled at bedside. Denies dizziness or lightheaded. Plan of care, discharge and instructions reviewed with .

## 2022-09-07 NOTE — PROGRESS NOTES
EGD complete, photos taken, 1 specimens taken by forcepspt tolerated procedure well    Scope Number gif 577used.

## 2022-09-07 NOTE — BRIEF OP NOTE
Brief Postoperative Note      Patient: Marina Elizabeth  YOB: 1975  MRN: [id number]    Date of Procedure: 9/7/2022    Pre-Op Diagnosis: Epigastric pain [R10.13] and none cardiac chest pain   Atypical chest pain [R07.89]    Post-Op Diagnosis: Feature very mild acid flux with no esophagitis duodenitis biopsy obtained from the duodenum the finding cannot explain the patient chest discomfort consult cardiology work-up and extremity study as well as 24-hour pH monitoring history of pulmonary reflux and duodenitis biopsy obtained for duodenum       Procedure(s):  EGD  EGD BIOPSY    Surgeon(s):  Burke Young MD     Assistant:  * No surgical staff found *    Anesthesia: Monitor Anesthesia Care    Estimated Blood Loss (mL):   Complications: None    Specimens:   ID Type Source Tests Collected by Time Destination   A : duodenum bx  Tissue Duodenum SURGICAL PATHOLOGY Burke Young MD 9/7/2022 0571        Implants:  * No implants in log *      Drains: * No LDAs found *    Findings:   Feature very mild acid flux with no esophagitis duodenitis biopsy obtained from the duodenum the finding cannot explain the patient chest discomfort consult cardiology work-up and extremity study as well as 24-hour pH monitoring history of pulmonary reflux and duodenitis biopsy obtained for duodenum    Electronically signed by Burke Young MD on 9/7/2022 at 9:09 AM

## 2022-09-07 NOTE — H&P
[address] [address]  Sedation/Analgesia History & Physical    Patient: Mell White: 1975  Med Rec#: [id number] Acc#: [id number]   Provider Performing Procedure: Africa Mcneil MD  Primary Care Physician: Lesle Lombard, APRN - CNP    PRE-PROCEDURE   Full CODE [x]Yes  DNR-CCA/DNR-CC []Yes   Brief History/Pre-Procedure Diagnosis:    . Epigastric pain  ESOPHAGOSCOPY / EGD       2. Atypical chest pain  ESOPHAGOSCOPY / EGD             MEDICAL HISTORY  []CAD/Valve  []Liver Disease  []Lung Disease []Diabetes  []Hypertension []Renal Disease  []Additional information:       has a past medical history of Heartburn and Unintentional weight loss. SURGICAL HISTORY   has a past surgical history that includes Colonoscopy (Left, 7/29/2020) and Upper gastrointestinal endoscopy (Left, 7/29/2020). Additional information:       ALLERGIES   Allergies as of 08/01/2022    (No Known Allergies)     Additional information:       MEDICATIONS   Coumadin Use Last 7 Days [x]No []Yes  Antiplatelet drug therapy use last 7 days  [x]No []Yes  Other anticoagulant use last 7 days  [x]No []Yes    Current Facility-Administered Medications:     0.9 % sodium chloride infusion, , IntraVENous, Continuous, Africa Mcneil MD, Last Rate: 75 mL/hr at 09/07/22 0756, New Bag at 09/07/22 0756  Prior to Admission medications    Medication Sig Start Date End Date Taking? Authorizing Provider   pantoprazole (PROTONIX) 40 MG tablet Take 1 tablet by mouth every morning (before breakfast) 7/29/22 9/7/22  NORMA Valencia CNP   therapeutic multivitamin-minerals [facility]) tablet Take 1 tablet by mouth daily.       Historical Provider, MD     Additional information:       PHYSICAL:   Heart:  [x]Regular rate and rhythm  []Other:    Lungs:  [x]Clear    []Other:    Abdomen: [x]Soft    []Other:    Mental Status: [x]Alert & Oriented  []Other:      VITAL SIGNS   Patient Vitals for the past 24 hrs:   BP Temp Temp src Pulse Resp SpO2 Height Weight   09/07/22 0746 132/77 97.8 °F (36.6 °C) Temporal 80 16 100 % 6' 2\" (1.88 m) 154 lb 12.8 oz (70.2 kg)       PLANNED PROCEDURE   [x]EGD  []Colonoscopy []Flex Sigmoid  []ERCP []EUS   []Cystoscopy  [] CATH [] BRONCH   Consent: I have discussed with the patient and/or the patient representative the indication, alternatives, and the possible risks and/or complications of the planned procedure and the anesthesia methods. The patient and/or patient representative appear to understand and agree to proceed. SEDATION  Planned agent:[x]Midazolam []Meperidine [x]Sublimaze []Morphine  []Diazepam  []Other:     ASA Classification: Class 2 - A normal healthy patient with mild systemic disease    Airway Assessment: Mallampati Class I - (soft palate, fauces, uvula & anterior/posterior tonsillar pillars are visible)    Monitoring and Safety: The patient will be placed on a cardiac monitor and vital signs, pulse oximetry and level of consciousness will be continuously evaluated throughout the procedure. The patient will be closely monitored until recovery from the medications is complete and the patient has returned to baseline status. Respiratory therapy will be on standby during the procedure. [x]Pre-procedure diagnostic studies complete and results available. Comment:    [x]Previous sedation/anesthesia experiences assessed. Comment:    [x]The patient is an appropriate candidate to undergo the planned procedure sedation and anesthesia. (Refer to nursing sedation/analgesia documentation record)  [x]Formulation and discussion of sedation/procedure plan, risks, and expectations with patient and/or responsible adult completed. [x]Patient examined immediately prior to the procedure.  (Refer to nursing sedation/analgesia documentation record)    Mustapha Bernstein MD, MD   Electronically signed 9/7/2022 at 8:55 AM

## 2022-09-16 ENCOUNTER — HOSPITAL ENCOUNTER (OUTPATIENT)
Dept: NON INVASIVE DIAGNOSTICS | Age: 47
Discharge: HOME | End: 2022-09-16
Payer: COMMERCIAL

## 2022-09-16 DIAGNOSIS — K21.9 GASTROESOPHAGEAL REFLUX DISEASE, UNSPECIFIED WHETHER ESOPHAGITIS PRESENT: ICD-10-CM

## 2022-09-16 DIAGNOSIS — R07.89 CHEST PAIN, ATYPICAL: ICD-10-CM

## 2022-09-16 LAB
LV EF: 55 %
LVEF MODALITY: NORMAL

## 2022-09-16 PROCEDURE — 93017 CV STRESS TEST TRACING ONLY: CPT

## 2022-09-16 PROCEDURE — 6360000002 HC RX W HCPCS

## 2022-09-16 PROCEDURE — 78452 HT MUSCLE IMAGE SPECT MULT: CPT

## 2022-09-16 PROCEDURE — 3430000000 HC RX DIAGNOSTIC RADIOPHARMACEUTICAL: Performed by: INTERNAL MEDICINE

## 2022-09-16 PROCEDURE — A9500 TC99M SESTAMIBI: HCPCS | Performed by: INTERNAL MEDICINE

## 2022-09-16 PROCEDURE — 93306 TTE W/DOPPLER COMPLETE: CPT

## 2022-09-16 PROCEDURE — 93017 CV STRESS TEST TRACING ONLY: CPT | Performed by: INTERNAL MEDICINE

## 2022-09-16 RX ADMIN — Medication 9.7 MILLICURIE: at 13:55

## 2022-09-16 RX ADMIN — Medication 32.3 MILLICURIE: at 14:50

## 2022-09-19 ENCOUNTER — OFFICE VISIT (OUTPATIENT)
Dept: CARDIOLOGY CLINIC | Age: 47
End: 2022-09-19
Payer: COMMERCIAL

## 2022-09-19 VITALS
SYSTOLIC BLOOD PRESSURE: 111 MMHG | DIASTOLIC BLOOD PRESSURE: 71 MMHG | WEIGHT: 153 LBS | BODY MASS INDEX: 19.64 KG/M2 | HEIGHT: 74 IN | HEART RATE: 86 BPM

## 2022-09-19 DIAGNOSIS — K21.9 GASTROESOPHAGEAL REFLUX DISEASE, UNSPECIFIED WHETHER ESOPHAGITIS PRESENT: ICD-10-CM

## 2022-09-19 DIAGNOSIS — Z87.898 HISTORY OF CHEST PAIN: Primary | ICD-10-CM

## 2022-09-19 PROBLEM — R07.9 CHEST PAIN: Status: RESOLVED | Noted: 2020-01-17 | Resolved: 2022-09-19

## 2022-09-19 PROCEDURE — 99213 OFFICE O/P EST LOW 20 MIN: CPT | Performed by: INTERNAL MEDICINE

## 2022-09-19 NOTE — PROGRESS NOTES
Chief Complaint   Patient presents with    Follow-up     testing       Patient Active Problem List   Diagnosis    Hx of Chest pain, atypical    History of chest pain Atypical    GERD (gastroesophageal reflux disease)     Was sent from ED form cp    4 week stress and echo follow up. EKG done 2022.     No more chest pain  after protonix  Had  EGD  2022    Denied sob or dizziness  Ekg 22    Some arm pain at site of Iv access and 3 days back and now getting better  No focal sign of inflammation  Advised to take asa 325 mg   No swelling or arm edema    Hx of atypical cp- chronic     Denied sob, HA, dizziness, palpitations or swelling      FHX  None for CAD    Smokes 1 ppd for 24 yrs    Past Surgical History:   Procedure Laterality Date    COLONOSCOPY Left 2020    COLONOSCOPY POLYPECTOMY SNARE/COLD BIOPSY performed by Tomás Singh MD at [facility]    UPPER GASTROINTESTINAL ENDOSCOPY Left 2020    [facility]    UPPER GASTROINTESTINAL ENDOSCOPY N/A 2022    EGD performed by Tomás Singh MD at [place] [id number] Left 2022    [facility]       No Known Allergies     Family History   Problem Relation Age of Onset    Colon Cancer Mother         passed at 36    Colon Cancer Father     Prostate Cancer Father     Esophageal Cancer Neg Hx     Liver Cancer Neg Hx     Rectal Cancer Neg Hx     Stomach Cancer Neg Hx         Social History     Socioeconomic History    Marital status: Single     Spouse name: Not on file    Number of children: Not on file    Years of education: Not on file    Highest education level: Not on file   Occupational History    Not on file   Tobacco Use    Smoking status: Former     Packs/day: 1.00     Types: Cigarettes     Quit date:      Years since quittin.7    Smokeless tobacco: Never   Vaping Use    Vaping Use: Never used   Substance and Sexual Activity    Alcohol use: Yes    Drug use: Yes     Types: Marijuana Yamileth Postin)     Comment: daily    Sexual activity: Not on file   Other Topics Concern    Not on file   Social History Narrative    Not on file     Social Determinants of Health     Financial Resource Strain: Not on file   Food Insecurity: Not on file   Transportation Needs: Not on file   Physical Activity: Not on file   Stress: Not on file   Social Connections: Not on file   Intimate Partner Violence: Not on file   Housing Stability: Not on file       Current Outpatient Medications   Medication Sig Dispense Refill    therapeutic multivitamin-minerals (THERAGRAN-M) tablet Take 1 tablet by mouth daily. pantoprazole (PROTONIX) 40 MG tablet Take 1 tablet by mouth every morning (before breakfast) 30 tablet 0     No current facility-administered medications for this visit. Review of Systems -     General ROS: negative  Psychological ROS: negative  Hematological and Lymphatic ROS: No history of blood clots or bleeding disorder. Respiratory ROS: no cough,  or wheezing, the rest see HPI  Cardiovascular ROS: See HPI  Gastrointestinal ROS: negative  Genito-Urinary ROS: no dysuria, trouble voiding, or hematuria  Musculoskeletal ROS: negative  Neurological ROS: no TIA or stroke symptoms  Dermatological ROS: negative      Blood pressure 111/71, pulse 86, height 6' 2\" (1.88 m), weight 153 lb (69.4 kg).         Physical Examination:    General appearance - alert, well appearing, and in no distress  HEENT- Pink conjunctiva  , Non-icteri sclera,PERRLA  Mental status - alert, oriented to person, place, and time  Neck - supple, no significant adenopathy, no JVD, or carotid bruits  Chest - clear to auscultation, no wheezes, rales or rhonchi, symmetric air entry  Heart - normal rate, regular rhythm, normal S1, S2, no murmurs, rubs, clicks or gallops  Abdomen - soft, nontender, nondistended, no masses or organomegaly  CONCHITA- no CVA or flank tenderness, no suprapubic tenderness  Neurological - alert, oriented, normal speech, no focal findings or movement disorder noted  Musculoskeletal/limbs - no joint tenderness, deformity or swelling   - peripheral pulses normal, no pedal edema, no clubbing or cyanosis  Skin - normal coloration and turgor, no rashes, no suspicious skin lesions noted  Psych- appropriate mood and affect    Lab  No results for input(s): CKTOTAL, CKMB, CKMBINDEX, TROPONINI in the last 72 hours.   CBC:   Lab Results   Component Value Date/Time    WBC 5.6 08/18/2022 07:04 PM    RBC 4.30 08/18/2022 07:04 PM    HGB 12.9 08/18/2022 07:04 PM    HCT 38.8 08/18/2022 07:04 PM    MCV 90.2 08/18/2022 07:04 PM    MCH 30.0 08/18/2022 07:04 PM    MCHC 33.2 08/18/2022 07:04 PM    RDW 13.6 09/27/2013 06:00 PM     08/18/2022 07:04 PM    MPV 9.7 08/18/2022 07:04 PM     BMP:    Lab Results   Component Value Date/Time     08/18/2022 07:04 PM    K 3.7 08/18/2022 07:04 PM     08/18/2022 07:04 PM    CO2 26 08/18/2022 07:04 PM    BUN 14 08/18/2022 07:04 PM    LABALBU 5.0 06/30/2022 09:30 PM    CREATININE 0.9 08/18/2022 07:04 PM    CALCIUM 9.5 08/18/2022 07:04 PM    LABGLOM >90 08/18/2022 07:04 PM    GLUCOSE 96 08/18/2022 07:04 PM     Hepatic Function Panel:    Lab Results   Component Value Date/Time    ALKPHOS 50 06/30/2022 09:30 PM    ALT 36 06/30/2022 09:30 PM    AST 37 06/30/2022 09:30 PM    PROT 7.5 06/30/2022 09:30 PM    BILITOT 0.6 06/30/2022 09:30 PM    BILIDIR <0.2 06/30/2022 09:30 PM    LABALBU 5.0 06/30/2022 09:30 PM     Magnesium:    Lab Results   Component Value Date/Time    MG 1.9 11/09/2020 03:37 PM     Warfarin PT/INR:  No components found for: PTPATWAR, PTINRWAR  HgBA1c:    Lab Results   Component Value Date/Time    LABA1C 5.3 01/18/2020 04:30 AM     FLP:    Lab Results   Component Value Date/Time    TRIG 200 04/04/2022 01:44 PM    HDL 73 04/04/2022 01:44 PM    LDLCALC 97 04/04/2022 01:44 PM     TSH:    Lab Results   Component Value Date/Time TSH 0.844 04/04/2022 01:44 PM          Peak HR:174 bpm                      HR response: Appropriate    Peak BP:200/100 mmHg                 BP response: Normal Resting BP with    Predicted HR: 177 bpm                appropriate response to Stress    % of predicted HR: 98                HR/BP product:86299    Test duration:11 min                 Max exercise: 12.1 METS    Reason for termination:Target HR    achieved                             Exercise effort:Good             Imaging Results:Calculated gated LVEF 49 %. The T.I.D. ratio was 1.04 . There is moderate attenuation artifact noted in the inferior wall seems to   be related to bowel artifact. There was no evidence of definite reversible tracer uptake. The nuclear images is not suggestive for myocardial ischemia. Conclusions        Summary    Exercise EKG stress test is not suggestive for ischemia. This Nuclear Medicine study was negative for ischemia . Signatures        ----------------------------------------------------------------    Electronically signed by Evert Chou MD (Interpreting    Cardiologist) on 01/18/2020 at 14:34    ----------------------------------------------------------------         Ekg4/6/2020  NSR, NO acute abn       Conclusions      Summary   Normal left ventricle size and systolic function. Ejection fraction was   estimated at 55 %. There were no regional left ventricular wall motion   abnormalities and wall thickness was within normal limits. Signature      ----------------------------------------------------------------   Electronically signed by Evert Chou MD (Interpreting   physician) on 01/18/2020 at 06:20 PM       Conclusions      Summary   Normal left ventricle size and systolic function. Ejection fraction was   estimated at 55 %. There were no regional left ventricular wall motion   abnormalities and wall thickness was within normal limits.       Signature ----------------------------------------------------------------   Electronically signed by Ethel Buck MD (Interpreting   physician) on 09/16/2022 at 06:39 PM   ----------------------------------------------------------------          Conclusions      Summary   Lexiscan EKG stress test is not suggestive for ischemia. This Nuclear Medicine study was negative for ischemia . Signatures      ----------------------------------------------------------------   Electronically signed by Ethel Buck MD (Interpreting   Cardiologist) on 09/16/2022 at 19:15   ----------------------------------------------------------------        Normal sinus rhythm  Normal ECG  When compared with ECG of 24-MAR-2020 10:30,  No significant change was found  Confirmed by Mercy Martinez ([id number]) on 4/2/2020 8:03:39 AM    Ekg 8/18/22  Normal sinus rhythm  Normal ECG  When compared with ECG of 01-JUL-2022 00:01,  No significant change was found      Assessment       Diagnosis Orders   1. Gastroesophageal reflux disease, unspecified whether esophagitis present        2.  History of chest pain Atypical                  Plan     The  most current meds and labs reviewed    ekg no abnromality  Troponin Negative    Hx of  chest pain is noncardiac  In nature- better  Chest pain recurrence  Atypical  The ex nuc and echo- WNL  Cont with EGD  Avoid ibuprofen ( use to take)   Better after  protonix    Extensive discussion  Has been done with the patient    Had EGD    D/w the pat the plan of care    May go back to work tomorrow    Stable and doing well    RTC in  6 months    Parish Younger, [facility]

## 2022-09-19 NOTE — LETTER
[address] [facility] [address] [place] [address]  Phone: [phone]  Fax: [phone]    Manuel Martines MD        September 19, 2022     Patient: Nesha Houston   YOB: 1975   Date of Visit: 9/19/2022       To Whom It May Concern: It is my medical opinion that Yael Garcia may return to work on 9-. If you have any questions or concerns, please don't hesitate to call.     Sincerely,        Manuel Martines MD

## 2023-03-20 ENCOUNTER — TELEPHONE (OUTPATIENT)
Dept: CARDIOLOGY CLINIC | Age: 48
End: 2023-03-20

## 2023-03-20 NOTE — TELEPHONE ENCOUNTER
Pt no showed appt 03/20/2023. Spoke to pt's girlfriend and let her know he missed his appt.  She said she will relay the message and pt will call back to r/s appt,

## 2025-03-28 ENCOUNTER — LAB (OUTPATIENT)
Dept: LAB | Age: 50
End: 2025-03-28

## 2025-03-28 LAB
25(OH)D3 SERPL-MCNC: 41 NG/ML (ref 30–100)
ALBUMIN SERPL BCG-MCNC: 4.5 G/DL (ref 3.4–4.9)
ALP SERPL-CCNC: 57 U/L (ref 40–129)
ALT SERPL W/O P-5'-P-CCNC: 33 U/L (ref 10–50)
ANION GAP SERPL CALC-SCNC: 12 MEQ/L (ref 8–16)
AST SERPL-CCNC: 35 U/L (ref 10–50)
BASOPHILS ABSOLUTE: 0 THOU/MM3 (ref 0–0.1)
BASOPHILS NFR BLD AUTO: 0.5 %
BILIRUB SERPL-MCNC: 0.4 MG/DL (ref 0.3–1.2)
BUN SERPL-MCNC: 12 MG/DL (ref 8–23)
CALCIUM SERPL-MCNC: 8.9 MG/DL (ref 8.6–10)
CHLORIDE SERPL-SCNC: 105 MEQ/L (ref 98–111)
CHOLESTEROL, FASTING: 170 MG/DL (ref 100–199)
CO2 SERPL-SCNC: 24 MEQ/L (ref 22–29)
CREAT SERPL-MCNC: 0.8 MG/DL (ref 0.7–1.2)
DEPRECATED RDW RBC AUTO: 46.5 FL (ref 35–45)
EOSINOPHIL NFR BLD AUTO: 1.8 %
EOSINOPHILS ABSOLUTE: 0.1 THOU/MM3 (ref 0–0.4)
ERYTHROCYTE [DISTWIDTH] IN BLOOD BY AUTOMATED COUNT: 14 % (ref 11.5–14.5)
FERRITIN SERPL IA-MCNC: 185 NG/ML (ref 30–400)
FOLATE SERPL-MCNC: 17.5 NG/ML (ref 4.6–34.8)
GFR SERPL CREATININE-BSD FRML MDRD: > 90 ML/MIN/1.73M2
GLUCOSE SERPL-MCNC: 71 MG/DL (ref 74–109)
HCT VFR BLD AUTO: 38.5 % (ref 42–52)
HDLC SERPL-MCNC: 56 MG/DL
HGB BLD-MCNC: 12.6 GM/DL (ref 14–18)
IMM GRANULOCYTES # BLD AUTO: 0.01 THOU/MM3 (ref 0–0.07)
IMM GRANULOCYTES NFR BLD AUTO: 0.2 %
IRON SATN MFR SERPL: 47 % (ref 20–50)
IRON SERPL-MCNC: 133 UG/DL (ref 61–157)
LDLC SERPL CALC-MCNC: 83 MG/DL
LYMPHOCYTES ABSOLUTE: 2.2 THOU/MM3 (ref 1–4.8)
LYMPHOCYTES NFR BLD AUTO: 50.9 %
MCH RBC QN AUTO: 29.5 PG (ref 26–33)
MCHC RBC AUTO-ENTMCNC: 32.7 GM/DL (ref 32.2–35.5)
MCV RBC AUTO: 90.2 FL (ref 80–94)
MONOCYTES ABSOLUTE: 0.3 THOU/MM3 (ref 0.4–1.3)
MONOCYTES NFR BLD AUTO: 7.2 %
NEUTROPHILS ABSOLUTE: 1.7 THOU/MM3 (ref 1.8–7.7)
NEUTROPHILS NFR BLD AUTO: 39.4 %
NRBC BLD AUTO-RTO: 0 /100 WBC
PLATELET # BLD AUTO: 267 THOU/MM3 (ref 130–400)
PMV BLD AUTO: 9.9 FL (ref 9.4–12.4)
POTASSIUM SERPL-SCNC: 4.4 MEQ/L (ref 3.5–5.2)
PROT SERPL-MCNC: 7 G/DL (ref 6.4–8.3)
RBC # BLD AUTO: 4.27 MILL/MM3 (ref 4.7–6.1)
SODIUM SERPL-SCNC: 141 MEQ/L (ref 135–145)
T4 FREE SERPL-MCNC: 1.1 NG/DL (ref 0.92–1.68)
TIBC SERPL-MCNC: 284 UG/DL (ref 171–450)
TRIGLYCERIDE, FASTING: 155 MG/DL (ref 0–199)
TSH SERPL DL<=0.05 MIU/L-ACNC: 0.36 UIU/ML (ref 0.27–4.2)
VIT B12 SERPL-MCNC: 303 PG/ML (ref 232–1245)
WBC # BLD AUTO: 4.4 THOU/MM3 (ref 4.8–10.8)

## 2025-04-01 LAB — VIT B1 PYROPHOSHATE BLD-SCNC: 112 NMOL/L (ref 70–180)

## 2025-05-22 ENCOUNTER — HOSPITAL ENCOUNTER (EMERGENCY)
Age: 50
Discharge: HOME | End: 2025-05-22
Attending: STUDENT IN AN ORGANIZED HEALTH CARE EDUCATION/TRAINING PROGRAM

## 2025-05-22 VITALS
SYSTOLIC BLOOD PRESSURE: 107 MMHG | OXYGEN SATURATION: 98 % | TEMPERATURE: 98.1 F | DIASTOLIC BLOOD PRESSURE: 71 MMHG | HEART RATE: 97 BPM | RESPIRATION RATE: 13 BRPM

## 2025-05-22 DIAGNOSIS — R55 PRE-SYNCOPE: Primary | ICD-10-CM

## 2025-05-22 LAB
ALBUMIN SERPL BCG-MCNC: 4.5 G/DL (ref 3.4–4.9)
ALP SERPL-CCNC: 61 U/L (ref 40–129)
ALT SERPL W/O P-5'-P-CCNC: 53 U/L (ref 10–50)
ANION GAP SERPL CALC-SCNC: 17 MEQ/L (ref 8–16)
AST SERPL-CCNC: 74 U/L (ref 10–50)
BASOPHILS ABSOLUTE: 0 THOU/MM3 (ref 0–0.1)
BASOPHILS NFR BLD AUTO: 0.5 %
BILIRUB SERPL-MCNC: 0.3 MG/DL (ref 0.3–1.2)
BUN SERPL-MCNC: 8 MG/DL (ref 8–23)
CALCIUM SERPL-MCNC: 9.5 MG/DL (ref 8.6–10)
CHLORIDE SERPL-SCNC: 101 MEQ/L (ref 98–111)
CO2 SERPL-SCNC: 24 MEQ/L (ref 22–29)
CREAT SERPL-MCNC: 1 MG/DL (ref 0.7–1.2)
DEPRECATED RDW RBC AUTO: 43.8 FL (ref 35–45)
EOSINOPHIL NFR BLD AUTO: 0.7 %
EOSINOPHILS ABSOLUTE: 0 THOU/MM3 (ref 0–0.4)
ERYTHROCYTE [DISTWIDTH] IN BLOOD BY AUTOMATED COUNT: 13.3 % (ref 11.5–14.5)
ETHANOL SERPL-MCNC: < 0.01 % (ref 0–0.08)
GFR SERPL CREATININE-BSD FRML MDRD: > 90 ML/MIN/1.73M2
GLUCOSE SERPL-MCNC: 76 MG/DL (ref 74–109)
HCT VFR BLD AUTO: 42.7 % (ref 42–52)
HGB BLD-MCNC: 14 GM/DL (ref 14–18)
IMM GRANULOCYTES # BLD AUTO: 0.01 THOU/MM3 (ref 0–0.07)
IMM GRANULOCYTES NFR BLD AUTO: 0.2 %
LYMPHOCYTES ABSOLUTE: 2.4 THOU/MM3 (ref 1–4.8)
LYMPHOCYTES NFR BLD AUTO: 38.7 %
MCH RBC QN AUTO: 29.3 PG (ref 26–33)
MCHC RBC AUTO-ENTMCNC: 32.8 GM/DL (ref 32.2–35.5)
MCV RBC AUTO: 89.3 FL (ref 80–94)
MONOCYTES ABSOLUTE: 0.5 THOU/MM3 (ref 0.4–1.3)
MONOCYTES NFR BLD AUTO: 7.9 %
NEUTROPHILS ABSOLUTE: 3.2 THOU/MM3 (ref 1.8–7.7)
NEUTROPHILS NFR BLD AUTO: 52 %
NRBC BLD AUTO-RTO: 0 /100 WBC
OSMOLALITY SERPL CALC.SUM OF ELEC: 280.2 MOSMOL/KG (ref 275–300)
PLATELET # BLD AUTO: 278 THOU/MM3 (ref 130–400)
PMV BLD AUTO: 9.4 FL (ref 9.4–12.4)
POTASSIUM SERPL-SCNC: 4.6 MEQ/L (ref 3.5–5.2)
PROT SERPL-MCNC: 7.4 G/DL (ref 6.4–8.3)
RBC # BLD AUTO: 4.78 MILL/MM3 (ref 4.7–6.1)
SODIUM SERPL-SCNC: 142 MEQ/L (ref 135–145)
TROPONIN, HIGH SENSITIVITY: 9 NG/L (ref 0–12)
WBC # BLD AUTO: 6.1 THOU/MM3 (ref 4.8–10.8)

## 2025-05-22 PROCEDURE — 93005 ELECTROCARDIOGRAM TRACING: CPT | Performed by: EMERGENCY MEDICINE

## 2025-05-22 PROCEDURE — 36415 COLL VENOUS BLD VENIPUNCTURE: CPT

## 2025-05-22 PROCEDURE — 99284 EMERGENCY DEPT VISIT MOD MDM: CPT

## 2025-05-22 PROCEDURE — 82077 ASSAY SPEC XCP UR&BREATH IA: CPT

## 2025-05-22 PROCEDURE — 84484 ASSAY OF TROPONIN QUANT: CPT

## 2025-05-22 PROCEDURE — 80053 COMPREHEN METABOLIC PANEL: CPT

## 2025-05-22 PROCEDURE — 85025 COMPLETE CBC W/AUTO DIFF WBC: CPT

## 2025-05-22 NOTE — ED PROVIDER NOTES
[facility] EMERGENCY DEPARTMENT    EMERGENCY MEDICINE      Pt Name: Samson Hobson  MRN: [id number]  Birthdate 1975  Date of evaluation: 5/22/2025  Treating Physician: Dr. Nogueira  Resident Physician: Davi Tomlinson MD    CHIEF COMPLAINT       Chief Complaint   Patient presents with    Fatigue     History obtained from chart review and the patient.    HISTORY OF PRESENT ILLNESS    HPI    Samson Hobson is a 50 y.o. male with PMH of alcohol use disorder, GERD presents to the emergency department for evaluation of a near syncopal episode at the doctor's office.  Patient stated he was at his doctor's office today and had an episode where he became diaphoretic and lightheaded and nearly lost consciousness.  Patient stated he did not lose consciousness and remembers the entire incident.  He stated he has had episodes similar to this in the past when his sugar gets low.  Patient endorses that he does drink 3 daily and yesterday had 3 24 ounce cans of beer, 1 16 ounce cans of beer, and 6 12 ounce cans of beer as well as 1 blunt.  Patient stated that daily he normally drinks 3 25 ounce cans of beers.  He is denying any chest pain, shortness of breath, nausea, vomiting, abdominal pain,  fever, swelling, trauma.    The patient has no other acute complaints at this time.        PAST MEDICAL AND SURGICAL HISTORY     Past Medical History:   Diagnosis Date    Heartburn     Unintentional weight loss        Past Surgical History:   Procedure Laterality Date    COLONOSCOPY Left 7/29/2020    COLONOSCOPY POLYPECTOMY SNARE/COLD BIOPSY performed by Lori Romero MD at [facility]    UPPER GASTROINTESTINAL ENDOSCOPY Left 7/29/2020    EGD BIOPSY performed by Lori Romero MD at [facility]    UPPER GASTROINTESTINAL ENDOSCOPY N/A 9/7/2022    EGD performed by Lori Romero MD at [facility]    UPPER GASTROINTESTINAL ENDOSCOPY Left 9/7/2022    EGD BIOPSY performed by Lori Romero MD at [facility]       CURRENT

## 2025-05-22 NOTE — ED NOTES
Pt to ED for eval after being seen at his PCP office this am. Pt was being seen for routine blood work. Per pts pcp, pt became diaphoretic and seemed dazed during visit. Pt arrives in stable condition and is alert and oriented x 4.

## 2025-05-22 NOTE — DISCHARGE INSTRUCTIONS
Follow-up with the resources that were provided by the behavioral health workers for alcohol cessation.  If you develop any chest pain, shortness of breath, lightheadedness, or other episodes of passing out please return to the emergency room.  Otherwise this time I recommend following up with your primary care provider and with alcohol cessation resources.

## 2025-05-23 LAB
EKG ATRIAL RATE: 70 BPM
EKG P AXIS: 71 DEGREES
EKG P-R INTERVAL: 140 MS
EKG Q-T INTERVAL: 404 MS
EKG QRS DURATION: 88 MS
EKG QTC CALCULATION (BAZETT): 436 MS
EKG R AXIS: 75 DEGREES
EKG T AXIS: 77 DEGREES
EKG VENTRICULAR RATE: 70 BPM

## (undated) DEVICE — TUBING IV STOPCOCK 48 CM 3 W

## (undated) DEVICE — KIT INF CTRL 2OZ LUB TBNG L12FT DBL END BRSH SYR OP4

## (undated) DEVICE — FORCEP RAD JAW W/NEEDLE 160CM

## (undated) DEVICE — SET ADMIN 25ML L117IN PMP MOD CK VLV RLER CLMP 2 SMRTSITE

## (undated) DEVICE — BIOGUARD A/W CLEANING ADAPTER

## (undated) DEVICE — CATHETER ETER IV 22GA L1IN POLYUR STR RADPQ INTROCAN SFTY

## (undated) DEVICE — 4-PORT MANIFOLD: Brand: NEPTUNE 2

## (undated) DEVICE — SOLUTION IV IRRIG WATER 1000ML POUR BRL 2F7114

## (undated) DEVICE — CONMED SCOPE SAVER BITE BLOCK, 20X27 MM: Brand: SCOPE SAVER

## (undated) DEVICE — SET LNR RED GRN W/ BASE CLEANASCOPE

## (undated) DEVICE — IV START KIT: Brand: MEDLINE INDUSTRIES, INC.

## (undated) DEVICE — SOLUTION IV 1000ML 0.45% SOD CHL PH 5 INJ USP VIAFLX PLAS

## (undated) DEVICE — FORCEPS BX L240CM JAW DIA3.2MM L CAP W/ NDL MIC MESH TOOTH

## (undated) DEVICE — SNARE POLYP SM W13MMXL240CM SHTH DIA2.4MM OVL FLX DISP